# Patient Record
Sex: FEMALE | Race: WHITE | NOT HISPANIC OR LATINO | URBAN - METROPOLITAN AREA
[De-identification: names, ages, dates, MRNs, and addresses within clinical notes are randomized per-mention and may not be internally consistent; named-entity substitution may affect disease eponyms.]

---

## 2020-06-01 ENCOUNTER — OUTPATIENT (OUTPATIENT)
Dept: OUTPATIENT SERVICES | Facility: HOSPITAL | Age: 45
LOS: 1 days | End: 2020-06-01
Payer: MEDICAID

## 2020-06-09 ENCOUNTER — TRANSCRIPTION ENCOUNTER (OUTPATIENT)
Age: 45
End: 2020-06-09

## 2020-06-09 ENCOUNTER — INPATIENT (INPATIENT)
Facility: HOSPITAL | Age: 45
LOS: 2 days | Discharge: HOME | DRG: 872 | End: 2020-06-12
Attending: HOSPITALIST | Admitting: HOSPITALIST
Payer: MEDICAID

## 2020-06-09 VITALS
HEART RATE: 111 BPM | DIASTOLIC BLOOD PRESSURE: 68 MMHG | RESPIRATION RATE: 16 BRPM | SYSTOLIC BLOOD PRESSURE: 123 MMHG | OXYGEN SATURATION: 96 % | HEIGHT: 63 IN | TEMPERATURE: 100 F | WEIGHT: 115.08 LBS

## 2020-06-09 DIAGNOSIS — E28.319 ASYMPTOMATIC PREMATURE MENOPAUSE: ICD-10-CM

## 2020-06-09 DIAGNOSIS — F12.90 CANNABIS USE, UNSPECIFIED, UNCOMPLICATED: ICD-10-CM

## 2020-06-09 DIAGNOSIS — R63.8 OTHER SYMPTOMS AND SIGNS CONCERNING FOOD AND FLUID INTAKE: ICD-10-CM

## 2020-06-09 DIAGNOSIS — F32.9 MAJOR DEPRESSIVE DISORDER, SINGLE EPISODE, UNSPECIFIED: ICD-10-CM

## 2020-06-09 DIAGNOSIS — R80.9 PROTEINURIA, UNSPECIFIED: ICD-10-CM

## 2020-06-09 DIAGNOSIS — A41.9 SEPSIS, UNSPECIFIED ORGANISM: ICD-10-CM

## 2020-06-09 DIAGNOSIS — F41.9 ANXIETY DISORDER, UNSPECIFIED: ICD-10-CM

## 2020-06-09 DIAGNOSIS — E87.1 HYPO-OSMOLALITY AND HYPONATREMIA: ICD-10-CM

## 2020-06-09 LAB
ALBUMIN SERPL ELPH-MCNC: 3.5 G/DL — SIGNIFICANT CHANGE UP (ref 3.3–5)
ALP SERPL-CCNC: 71 U/L — SIGNIFICANT CHANGE UP (ref 40–120)
ALT FLD-CCNC: 18 U/L — SIGNIFICANT CHANGE UP (ref 10–45)
ANION GAP SERPL CALC-SCNC: 12 MMOL/L — SIGNIFICANT CHANGE UP (ref 5–17)
APPEARANCE UR: ABNORMAL
AST SERPL-CCNC: 17 U/L — SIGNIFICANT CHANGE UP (ref 10–40)
BASOPHILS # BLD AUTO: 0 K/UL — SIGNIFICANT CHANGE UP (ref 0–0.2)
BASOPHILS NFR BLD AUTO: 0 % — SIGNIFICANT CHANGE UP (ref 0–2)
BILIRUB SERPL-MCNC: 0.5 MG/DL — SIGNIFICANT CHANGE UP (ref 0.2–1.2)
BILIRUB UR-MCNC: NEGATIVE — SIGNIFICANT CHANGE UP
BUN SERPL-MCNC: 7 MG/DL — SIGNIFICANT CHANGE UP (ref 7–23)
CALCIUM SERPL-MCNC: 8.2 MG/DL — LOW (ref 8.4–10.5)
CHLORIDE SERPL-SCNC: 96 MMOL/L — SIGNIFICANT CHANGE UP (ref 96–108)
CO2 SERPL-SCNC: 25 MMOL/L — SIGNIFICANT CHANGE UP (ref 22–31)
COLOR SPEC: YELLOW — SIGNIFICANT CHANGE UP
CREAT SERPL-MCNC: 0.84 MG/DL — SIGNIFICANT CHANGE UP (ref 0.5–1.3)
DIFF PNL FLD: ABNORMAL
EOSINOPHIL # BLD AUTO: 0 K/UL — SIGNIFICANT CHANGE UP (ref 0–0.5)
EOSINOPHIL NFR BLD AUTO: 0 % — SIGNIFICANT CHANGE UP (ref 0–6)
GLUCOSE SERPL-MCNC: 132 MG/DL — HIGH (ref 70–99)
GLUCOSE UR QL: NEGATIVE — SIGNIFICANT CHANGE UP
HCT VFR BLD CALC: 34.4 % — LOW (ref 34.5–45)
HGB BLD-MCNC: 11.5 G/DL — SIGNIFICANT CHANGE UP (ref 11.5–15.5)
KETONES UR-MCNC: NEGATIVE — SIGNIFICANT CHANGE UP
LACTATE SERPL-SCNC: 0.9 MMOL/L — SIGNIFICANT CHANGE UP (ref 0.5–2)
LEUKOCYTE ESTERASE UR-ACNC: ABNORMAL
LYMPHOCYTES # BLD AUTO: 0.65 K/UL — LOW (ref 1–3.3)
LYMPHOCYTES # BLD AUTO: 4.4 % — LOW (ref 13–44)
MCHC RBC-ENTMCNC: 29.3 PG — SIGNIFICANT CHANGE UP (ref 27–34)
MCHC RBC-ENTMCNC: 33.4 GM/DL — SIGNIFICANT CHANGE UP (ref 32–36)
MCV RBC AUTO: 87.5 FL — SIGNIFICANT CHANGE UP (ref 80–100)
MONOCYTES # BLD AUTO: 1.16 K/UL — HIGH (ref 0–0.9)
MONOCYTES NFR BLD AUTO: 7.9 % — SIGNIFICANT CHANGE UP (ref 2–14)
NEUTROPHILS # BLD AUTO: 12.87 K/UL — HIGH (ref 1.8–7.4)
NEUTROPHILS NFR BLD AUTO: 69.3 % — SIGNIFICANT CHANGE UP (ref 43–77)
NITRITE UR-MCNC: NEGATIVE — SIGNIFICANT CHANGE UP
PH UR: 6.5 — SIGNIFICANT CHANGE UP (ref 5–8)
PLATELET # BLD AUTO: 163 K/UL — SIGNIFICANT CHANGE UP (ref 150–400)
POTASSIUM SERPL-MCNC: 3.6 MMOL/L — SIGNIFICANT CHANGE UP (ref 3.5–5.3)
POTASSIUM SERPL-SCNC: 3.6 MMOL/L — SIGNIFICANT CHANGE UP (ref 3.5–5.3)
PROT SERPL-MCNC: 6.8 G/DL — SIGNIFICANT CHANGE UP (ref 6–8.3)
PROT UR-MCNC: 100 MG/DL
RAPID RVP RESULT: SIGNIFICANT CHANGE UP
RBC # BLD: 3.93 M/UL — SIGNIFICANT CHANGE UP (ref 3.8–5.2)
RBC # FLD: 12.6 % — SIGNIFICANT CHANGE UP (ref 10.3–14.5)
SARS-COV-2 RNA SPEC QL NAA+PROBE: SIGNIFICANT CHANGE UP
SODIUM SERPL-SCNC: 133 MMOL/L — LOW (ref 135–145)
SP GR SPEC: 1.02 — SIGNIFICANT CHANGE UP (ref 1–1.03)
UROBILINOGEN FLD QL: 1 E.U./DL — SIGNIFICANT CHANGE UP
WBC # BLD: 14.67 K/UL — HIGH (ref 3.8–10.5)
WBC # FLD AUTO: 14.67 K/UL — HIGH (ref 3.8–10.5)

## 2020-06-09 PROCEDURE — 99291 CRITICAL CARE FIRST HOUR: CPT

## 2020-06-09 PROCEDURE — 99223 1ST HOSP IP/OBS HIGH 75: CPT | Mod: GC

## 2020-06-09 PROCEDURE — 93010 ELECTROCARDIOGRAM REPORT: CPT

## 2020-06-09 PROCEDURE — 71045 X-RAY EXAM CHEST 1 VIEW: CPT | Mod: 26

## 2020-06-09 RX ORDER — SERTRALINE 25 MG/1
0 TABLET, FILM COATED ORAL
Qty: 0 | Refills: 0 | DISCHARGE

## 2020-06-09 RX ORDER — SERTRALINE 25 MG/1
100 TABLET, FILM COATED ORAL DAILY
Refills: 0 | Status: DISCONTINUED | OUTPATIENT
Start: 2020-06-09 | End: 2020-06-12

## 2020-06-09 RX ORDER — ACETAMINOPHEN 500 MG
650 TABLET ORAL EVERY 6 HOURS
Refills: 0 | Status: DISCONTINUED | OUTPATIENT
Start: 2020-06-09 | End: 2020-06-10

## 2020-06-09 RX ORDER — IBUPROFEN 200 MG
800 TABLET ORAL ONCE
Refills: 0 | Status: COMPLETED | OUTPATIENT
Start: 2020-06-09 | End: 2020-06-09

## 2020-06-09 RX ORDER — CEFTRIAXONE 500 MG/1
1000 INJECTION, POWDER, FOR SOLUTION INTRAMUSCULAR; INTRAVENOUS ONCE
Refills: 0 | Status: COMPLETED | OUTPATIENT
Start: 2020-06-09 | End: 2020-06-09

## 2020-06-09 RX ORDER — ENOXAPARIN SODIUM 100 MG/ML
40 INJECTION SUBCUTANEOUS EVERY 24 HOURS
Refills: 0 | Status: DISCONTINUED | OUTPATIENT
Start: 2020-06-09 | End: 2020-06-12

## 2020-06-09 RX ORDER — CEFTRIAXONE 500 MG/1
2000 INJECTION, POWDER, FOR SOLUTION INTRAMUSCULAR; INTRAVENOUS EVERY 24 HOURS
Refills: 0 | Status: DISCONTINUED | OUTPATIENT
Start: 2020-06-10 | End: 2020-06-10

## 2020-06-09 RX ORDER — CEFTRIAXONE 500 MG/1
1000 INJECTION, POWDER, FOR SOLUTION INTRAMUSCULAR; INTRAVENOUS ONCE
Refills: 0 | Status: DISCONTINUED | OUTPATIENT
Start: 2020-06-09 | End: 2020-06-09

## 2020-06-09 RX ORDER — SODIUM CHLORIDE 9 MG/ML
1000 INJECTION INTRAMUSCULAR; INTRAVENOUS; SUBCUTANEOUS ONCE
Refills: 0 | Status: COMPLETED | OUTPATIENT
Start: 2020-06-09 | End: 2020-06-09

## 2020-06-09 RX ORDER — SODIUM CHLORIDE 9 MG/ML
1000 INJECTION INTRAMUSCULAR; INTRAVENOUS; SUBCUTANEOUS
Refills: 0 | Status: DISCONTINUED | OUTPATIENT
Start: 2020-06-09 | End: 2020-06-10

## 2020-06-09 RX ORDER — SERTRALINE 25 MG/1
1 TABLET, FILM COATED ORAL
Qty: 0 | Refills: 0 | DISCHARGE

## 2020-06-09 RX ORDER — CLONAZEPAM 1 MG
1 TABLET ORAL
Qty: 0 | Refills: 0 | DISCHARGE

## 2020-06-09 RX ORDER — CLONAZEPAM 1 MG
0 TABLET ORAL
Qty: 0 | Refills: 0 | DISCHARGE

## 2020-06-09 RX ORDER — CLONAZEPAM 1 MG
1 TABLET ORAL EVERY 8 HOURS
Refills: 0 | Status: DISCONTINUED | OUTPATIENT
Start: 2020-06-09 | End: 2020-06-12

## 2020-06-09 RX ORDER — LANOLIN ALCOHOL/MO/W.PET/CERES
5 CREAM (GRAM) TOPICAL ONCE
Refills: 0 | Status: COMPLETED | OUTPATIENT
Start: 2020-06-09 | End: 2020-06-09

## 2020-06-09 RX ORDER — POTASSIUM CHLORIDE 20 MEQ
40 PACKET (EA) ORAL ONCE
Refills: 0 | Status: COMPLETED | OUTPATIENT
Start: 2020-06-09 | End: 2020-06-09

## 2020-06-09 RX ADMIN — SODIUM CHLORIDE 1000 MILLILITER(S): 9 INJECTION INTRAMUSCULAR; INTRAVENOUS; SUBCUTANEOUS at 15:10

## 2020-06-09 RX ADMIN — SODIUM CHLORIDE 1000 MILLILITER(S): 9 INJECTION INTRAMUSCULAR; INTRAVENOUS; SUBCUTANEOUS at 15:32

## 2020-06-09 RX ADMIN — Medication 1 MILLIGRAM(S): at 23:04

## 2020-06-09 RX ADMIN — Medication 800 MILLIGRAM(S): at 15:32

## 2020-06-09 RX ADMIN — Medication 40 MILLIEQUIVALENT(S): at 19:07

## 2020-06-09 RX ADMIN — ENOXAPARIN SODIUM 40 MILLIGRAM(S): 100 INJECTION SUBCUTANEOUS at 20:04

## 2020-06-09 RX ADMIN — SODIUM CHLORIDE 75 MILLILITER(S): 9 INJECTION INTRAMUSCULAR; INTRAVENOUS; SUBCUTANEOUS at 23:04

## 2020-06-09 RX ADMIN — CEFTRIAXONE 100 MILLIGRAM(S): 500 INJECTION, POWDER, FOR SOLUTION INTRAMUSCULAR; INTRAVENOUS at 15:52

## 2020-06-09 RX ADMIN — SERTRALINE 100 MILLIGRAM(S): 25 TABLET, FILM COATED ORAL at 21:40

## 2020-06-09 RX ADMIN — Medication 5 MILLIGRAM(S): at 21:40

## 2020-06-09 RX ADMIN — CEFTRIAXONE 100 MILLIGRAM(S): 500 INJECTION, POWDER, FOR SOLUTION INTRAMUSCULAR; INTRAVENOUS at 20:04

## 2020-06-09 NOTE — H&P ADULT - ASSESSMENT
Ms. Hirsch is 45yo F w/ no medical conditions who presents with 2 days persistent fever found to have R. CVA tenderness and positive UA concerning for sepsis 2/2 pyelonephritis. Ms. Hirsch is 45yo post menopausal woman w/ depression and HPV who presents with intermittent vaginal pain and 4 days persistent fever found to have R. CVA tenderness and positive UA concerning for sepsis 2/2 pyelonephritis.

## 2020-06-09 NOTE — DISCHARGE NOTE PROVIDER - HOSPITAL COURSE
REEMA GARDNER is 44y woman w/ no medical conditions who presents with 2 days persistent fever found to have R. CVA tenderness and positive UA concerning for sepsis 2/2 pyelonephritis.        Problem List/Main Diagnoses (system-based):         PSYCH:        RENAL:    #Proteinuria: UA with protein, likely 2/2 to infection. No Hx of DM (no glucose in urine either). Recommend outpatient eval.         ID:    Sepsis 2/2 Pylonephritis: On admission meeting 3/4 SIRS (HR>90, WBC>12, Temp >100.4) with Bands 18.4, Lactate 0.9. Soft pressures but oriented and w/o evidence of end organ damage. COVID neg. CXR clear, with no open wounds. UA+ with CVA tenderness concerning for pyelonephritis. Continue with Cefpodoxime 200mg BID for 10 day Course (End June 18).        New medications: Cefpodoxime 200mg    Labs to be followed outpatient: UA    Exam to be followed outpatient: CVA tenderness 44F with PMH of depression, HPV, and early menopause who presented with intermittent vaginal pain and 4 days persistent fever, met sepsis criteria 2/2 to right pyelonephritis seen on imaging, now on antibiotics and admitted to the [facility] for further management.        Problem List/Main Diagnoses:    #Sepsis 2/2 Pyelonephritis    #E. coli UTI    #GNR Bacteremia    #Proteinuria    #Anxiety/Depression    #HPV        Hospital Course:    44F with PMH of depression, HPV, and early menopause who presented with intermittent vaginal pain and 4 days persistent fever, met sepsis criteria 2/2 to right pyelonephritis seen on imaging and admitted to the regional medical floor for further work up. Patient originally underwent CT abdomen/pelvis without contrast which was followed up by retroperitoneal ultrasound to evaluate for abscess. Ultrasound demonstrated possible abscess in right kidney for which patient was re-imaged with CT abdomen/pelvis with oral/IV contrast and NM renal scan to evaluate kidney function. These studies showed***. Early in admission the patient had intermittent high temps (Tmax 104 F) controlled with Tylenol. She was started on Ceftriaxone. The patient's course was complicated by E. coli UTI and bacteremia***.         New medications:     Labs to be followed outpatient: UA    Exam to be followed outpatient: CVA tenderness 44F with PMH of depression, HPV, and early menopause who presented with intermittent vaginal pain and 4 days persistent fever, met sepsis criteria 2/2 to right pyelonephritis seen on imaging, now on antibiotics and admitted to the [facility] for further management.        Problem List/Main Diagnoses:    #Sepsis 2/2 Pyelonephritis    #E. coli UTI    #GNR Bacteremia    #Proteinuria    #Anxiety/Depression    #HPV        Hospital Course:    44F with PMH of depression, HPV, and early menopause who presented with intermittent vaginal pain and 4 days persistent fever, met sepsis criteria 2/2 to right pyelonephritis seen on imaging and admitted to the regional medical floor for further work up. Patient originally underwent CT abdomen/pelvis without contrast which was followed up by retroperitoneal ultrasound to evaluate for abscess. Ultrasound demonstrated possible abscess in right kidney for which patient was re-imaged with CT abdomen/pelvis with oral/IV contrast and NM renal scan to evaluate kidney function. These studies were negative for abscess and showed*** function of the kidneys. During admission the patient had intermittent high temps (Tmax 104 F) controlled with Tylenol. She was started on Ceftriaxone and broadened to Zosyn after one day given persistently high temperatures. The patient's course was complicated by E. coli UTI and bacteremia. Surveillance blood cultures were negative and the patient appeared to improve clinically after initiation of antibiotics.         New medications: Zosyn    Labs to be followed outpatient: UA    Exam to be followed outpatient: CVA tenderness 44F with PMH of depression, HPV, and early menopause who presented with intermittent vaginal pain and 4 days persistent fever, met sepsis criteria 2/2 to right pyelonephritis seen on imaging, now on antibiotics and admitted to the [facility] for further management.        Problem List/Main Diagnoses:    #Sepsis 2/2 Pyelonephritis    #E. coli UTI    #E. coli Bacteremia    #Proteinuria    #Anxiety/Depression    #HPV        Hospital Course:    44F with PMH of depression, HPV, and early menopause who presented with intermittent vaginal pain and 4 days persistent fever, met sepsis criteria 2/2 to right pyelonephritis seen on imaging and admitted to the regional medical floor for further work up. Patient originally underwent CT abdomen/pelvis without contrast which was followed up by retroperitoneal ultrasound to evaluate for abscess. Ultrasound demonstrated possible abscess in right kidney for which patient was re-imaged with CT abdomen/pelvis with oral/IV contrast and NM renal scan to evaluate kidney function. These studies were negative for abscess and showed preserved renal function with no obstruction. During admission the patient had intermittent high temps (Tmax 104 F) controlled with Tylenol. She was started on Ceftriaxone and broadened to Zosyn after one day given persistently high temperatures. The patient's course was complicated by E. coli UTI and bacteremia. Surveillance blood cultures were negative and the patient appeared to improve clinically after initiation of antibiotics. She was deemed stable for discharge on 6/12 with Urology follow up.        New medications: Ciprofloxacin    Labs to be followed outpatient: UA    Exam to be followed outpatient: CVA tenderness 44F with PMH of depression, HPV, and early menopause who presented with intermittent vaginal pain and 4 days persistent fever, met sepsis criteria 2/2 to right pyelonephritis seen on imaging, now on antibiotics and admitted to the [facility] for further management.        Problem List/Main Diagnoses:    #Sepsis 2/2 Pyelonephritis    #E. coli UTI    #E. coli Bacteremia    #Proteinuria    #Anxiety/Depression    #HPV        Hospital Course:    44F with PMH of depression, HPV, and early menopause who presented with intermittent vaginal pain and 4 days persistent fever, met sepsis criteria 2/2 to right pyelonephritis seen on imaging and admitted to the regional medical floor for further work up. Patient originally underwent CT abdomen/pelvis without contrast which was followed up by retroperitoneal ultrasound to evaluate for abscess. Ultrasound demonstrated possible abscess in right kidney for which patient was re-imaged with CT abdomen/pelvis with oral/IV contrast and NM renal scan to evaluate kidney function. These studies were negative for abscess and showed preserved renal function with no obstruction. During admission the patient had intermittent high temps (Tmax 104 F) controlled with Tylenol. She was started on Ceftriaxone and broadened to Zosyn after one day given persistently high temperatures. The patient's course was complicated by E. coli UTI and bacteremia. Surveillance blood cultures were negative and the patient appeared to improve clinically after initiation of antibiotics. She was deemed stable for discharge on 6/12 with Urology follow up.        New medications: Bactrim    Labs to be followed outpatient: UA    Exam to be followed outpatient: CVA tenderness

## 2020-06-09 NOTE — DISCHARGE NOTE PROVIDER - PROVIDER TOKENS
FREE:[LAST:[[facility]],PHONE:[phone]],FAX:[(   )    -],ADDRESS:[[address] [place] [address]]] FREE:[LAST:[[facility]],PHONE:[phone]],FAX:[(   )    -],ADDRESS:[[address] [place] [address]]],PROVIDER:[TOKEN:[[id number]]] PROVIDER:[TOKEN:[[id number]]],FREE:[LAST:[[facility]],PHONE:[phone]],FAX:[(   )    -],ADDRESS:[[facility] [address] [place] [address]]],PROVIDER:[TOKEN:[[id number]]]

## 2020-06-09 NOTE — H&P ADULT - PROBLEM SELECTOR PLAN 2
UA with protein, likely 2/2 to infection. No Hx of DM (no glucose in urine either).   -recommend outpatient eval UA with protein, likely 2/2 to infection. No Hx of DM (no glucose in urine either).   -recommend outpatient eval    #Hematuria  Likely 2/2 infection. Hb stable  -continue to monitor ADDENDUM: see above, on ceftriaxone, IVFs o/n in setting of low bp; followup ctxs ADDENDUM: see above, on ceftriaxone, IVFs o/n in setting of low bp; followup ctxs    #horseshoe kidney seen on prelim CT a/p, followup official report

## 2020-06-09 NOTE — ED PROVIDER NOTE - ATTENDING CONTRIBUTION TO CARE
44F pmh   p/w fever since friday, 102-104F taking tylenol, fever, chills, diffuse body aches. UA at  pos yesterday, unable to pickup abx thus far. Denies HA/n/v, coughing, diarrhea. Denies any urinary sx, freq/urg, no sick contacts. Exam tachycardic, febrile, +CVA, nontoxic. 44F no sig pmh, no prior surgeries, p/w fever since friday, 102-104F taking tylenol, fever, chills, diffuse body aches. UA at  pos yesterday, unable to pickup abx thus far. Denies HA/n/v, coughing, diarrhea. Denies any urinary sx, freq/urg, no sick contacts. Exam tachycardic, febrile, +CVA, nontoxic. Concern pyelo, uti, sepsis, less likely URI, consider viral process.

## 2020-06-09 NOTE — ED PROVIDER NOTE - DIAGNOSTIC INTERPRETATION
Chest x-ray interpreted by ER Physician Dr. Lam  Findings: heart size normal, no infiltrates, no pleural effusion, no PTX, no appreciated fracture.

## 2020-06-09 NOTE — DISCHARGE NOTE PROVIDER - NSDCMRMEDTOKEN_GEN_ALL_CORE_FT
KlonoPIN:   Zoloft: clonazePAM 1 mg oral tablet: 1 tab(s) orally 3 times a day, As Needed  sertraline 100 mg oral tablet: 1 tab(s) orally once a day Bactrim  mg-160 mg oral tablet: 1 tab(s) orally every 12 hours   clonazePAM 1 mg oral tablet: 1 tab(s) orally 3 times a day, As Needed  sertraline 100 mg oral tablet: 1 tab(s) orally once a day

## 2020-06-09 NOTE — H&P ADULT - PROBLEM SELECTOR PLAN 4
F: none  E: replete K <4, Mg <2  N: Regular  VTE: Padua <4 SCDs  GI PPx: Not indicated  Sleep Aid: None  Dispo: RMF  Code: Full    1) PCP   2) Date of Contact with PCP: o/n admission  3) PCP Contacted at Discharge: TBD  4) Summary of Handoff Given to PCP: TBD  5) Post-Discharge Appointment Date and Location: TBD Follows w/ jeromy johnson- [phone]  -continue home sertraline 100mg  -Clonazepam 1mg PRN TID reports early menopause. did not note when her LMP was. Unclear of family Hx. Also w/ dyspareunia and vaginal dryness  -f/u collateral from OB vs consult and set up outpatient reports early menopause. reports stopped having periods around 39 years old. with fhx also with early menopause. Also w/ dyspareunia and vaginal dryness.  -f/u collateral from OB vs consult and set up outpatient  -Requesting outpatient referral for gyn for vaginal dryness UA with protein, likely 2/2 to infection. No Hx of DM (no glucose in urine either).   -recommend outpatient eval    #Hematuria  Likely 2/2 infection. Hb stable  -continue to monitor

## 2020-06-09 NOTE — DISCHARGE NOTE PROVIDER - NSDCFUADDAPPT_GEN_ALL_CORE_FT
Please follow up with your new Primary Care Provider at [facility] on ____ @ ____. Visit the [address] office. If you need to make any changes to your appointment please call. Please bring a copy of this paper work with you. Please follow up with your Primary Care Provider, Dr. Ojeda, within 1-2 weeks after discharge.    Please follow up with Dr. Kirk on Monday, June 22nd at 1:00 PM. Please follow up with your Primary Care Provider, Dr. Ojeda, within 1-2 weeks after discharge.    Please follow up with Dr. Kirk (Urology) on Monday, June 22nd at 1:00 PM.

## 2020-06-09 NOTE — H&P ADULT - PROBLEM SELECTOR PLAN 7
F: none  E: replete K <4, Mg <2  N: Regular  VTE: Padua <4 SCDs  GI PPx: Not indicated  Sleep Aid: None  Dispo: RMF  Code: Full    1) PCP   2) Date of Contact with PCP: o/n admission  3) PCP Contacted at Discharge: TBD  4) Summary of Handoff Given to PCP: TBD  5) Post-Discharge Appointment Date and Location: TBD Daily user  -Discuss patient openness to quitting or cutting back Follows w/ jeromy johnson- [phone]  -continue home sertraline 100mg  -Clonazepam 1mg PRN TID

## 2020-06-09 NOTE — H&P ADULT - PROBLEM SELECTOR PLAN 5
reports early menopause. did not note when her LMP was. Unclear of family Hx  -f/u collateral from OB Follows w/ jeromy johnson- [phone]  -continue home sertraline 100mg  -Clonazepam 1mg PRN TID Glucose >130 on admission  -f/u A1C    ADDENDUM: likely stress related hyperglycemia in setting of infection, monitor glucose, check a1c

## 2020-06-09 NOTE — ED PROVIDER NOTE - OBJECTIVE STATEMENT
45 y/o F with no PMHx presents to the ED with fever, body aches, chills, going on since friday. Noted fever of 102, which improved, went to urgent care yesteday and noted UTI and given Abx. Never picked up the abx but today noted a fever of 104 alongside aches and chills Denies any headache, dizziness, nausea, vomiting, chest pain, SOB, abdominal pain, back pain, cough, urinary symptoms, no sick contacts, no travel.

## 2020-06-09 NOTE — H&P ADULT - PROBLEM SELECTOR PLAN 8
F: none  E: replete K <4, Mg <2  N: Regular  VTE: Padua <4 SCDs  GI PPx: Not indicated  Sleep Aid: None  Dispo: RMF  Code: Full    1) PCP   2) Date of Contact with PCP: o/n admission  3) PCP Contacted at Discharge: TBD  4) Summary of Handoff Given to PCP: TBD  5) Post-Discharge Appointment Date and Location: TBD Daily user  -Discuss patient openness to quitting or cutting back

## 2020-06-09 NOTE — H&P ADULT - NSHPSOCIALHISTORY_GEN_ALL_CORE
.  Tobacco use:   EtOH use:  Illicit drug use:    Living situation:  Occupation: .  Tobacco use: none  EtOH use: 1 glass wine weekly  Illicit drug use: Daily marijuana    Living situation: [address] with BF (edy) monogamous  Occupation: Recent  (left 11/2019)

## 2020-06-09 NOTE — ED ADULT NURSE NOTE - OBJECTIVE STATEMENT
43 y/o female presents to ED with c/o fevers/chills/body aches x3 days. Denies cp/sob/cough, no loss of taste/smell, no n/v, endorses some diarrhea. States she has been drinking fluids/gatorade and taking Tylenol for fever/body aches which provides some relief. Speaking in full sentences. Pt with hx anxiety, appears anxious and tearful at this time.

## 2020-06-09 NOTE — H&P ADULT - ATTENDING COMMENTS
patient seen and examined overnight a/f sepsis 2/2 right sided pyelonephritis   reviewed pertinent data, h&p, PE as above except pt  is tired appearing, slightly dry to euvolemic on exam, w/ R CVA tenderness.     1. sepsis / R pyelonephritis: on ceftriaxone 2g, followup ctxs, followup renal sonogram; started on IVFs o/n in setting of borderline low BP (pt denies hx of low bp but is 60 kg; denies dizziness, feeling faint)

## 2020-06-09 NOTE — H&P ADULT - HISTORY OF PRESENT ILLNESS
Ms. Hirsch is 45yo F w/ no medical conditions who presents with 2 days persistent fever. Patient presented to an urgent care yesterday with complaint of fever. She had a positive UA and was written for Abx but she did not pick it up because her pharmacy didn't have it. The fever has been associated with body aches. She was initially treating her symptoms with Tylenol which was controlling it, but it persisted. Today, since she did not  her medications she noted a temp of 104. She denies dysuria, but endorses urinary frequency.     Patient denies Chills, headache, CP, SOB, Abdominal pain, N/V/D, numbness or chills.    In the ED:  Temp 101.9 | HR 91-99 | BP 89-97/52-66 | RR 16-18 | 99% RA |  Notable Labs: WBC 14.67, Bands 18.4, Lactate 0.9  Imaging: CXR clear w/o infiltrate. No pulmonary edema.  EKG: Pending  Treatment Received: 1g CTX, 800mg Ibuprofen, 1L NS x2 Ms. Hirsch is 43yo F w/ no medical conditions who presents with 2 days persistent fever.  The fever has been associated with body aches. She was initially treating her symptoms with Tylenol which was controlling it, but it persisted. Patient presented to a [facility] urgent care 6/8 with complaint of fever. She had a positive UA and was written for Macrobid 100mg BID for 7 days but she did not pick it up because her pharmacy didn't have it. Today, she noted a temp of 104 that was not improved by tylenol so she came to the ED. She denies dysuria, but endorses urinary frequency.     Patient denies Chills, headache, CP, SOB, Abdominal pain, N/V/D, numbness or chills.    In the ED:  Temp 101.9 | HR 91-99 | BP 89-97/52-66 | RR 16-18 | 99% RA |  Notable Labs: WBC 14.67, Bands 18.4, Lactate 0.9  Imaging: CXR clear w/o infiltrate. No pulmonary edema.  EKG: Pending  Treatment Received: 1g CTX, 800mg Ibuprofen, 1L NS x2 Ms. Hirsch is 43yo post menopausal woman w/ depression and HPV who presents with intermittent vaginal pain and 4 days persistent fever. On 6/3 patient, with one episode of vaginal pain after intercourse with boyfriend. It resolved the next day. Then on 6/6 she noted fevers. The fever has been associated with Chills and body aches. She was initially tried treating her symptoms with Tylenol which did not help. Patient presented to a [facility] urgent care 6/8 with complaint of fever. She had a positive UA and was written for Macrobid 100mg BID for 7 days but she did not pick it up because of an issue with the prescription. Today, she noted a temp of 104, called that was not improved by tylenol so she came to the ED. Notes intermittent dyspareunia and vaginal dryness.     Recent COVID Contacts: Last Wednesday Cousin- had COVID 2 months ago    Patient denies: Headache, CP, Cough, SOB, Abdominal pain, N/V/D, dysuria, urinary frequency, vaginal discharge, numbness or chills.    In the ED:  Temp 101.9 | HR 91-99 | BP 89-97/52-66 | RR 16-18 | 99% RA |  Notable Labs: WBC 14.67, Bands 18.4, Lactate 0.9  Imaging: CXR clear w/o infiltrate. No pulmonary edema.  EKG: Pending  Treatment Received: 1g CTX, 800mg Ibuprofen, 1L NS x2 Ms. Hirsch is 45yo post menopausal woman w/ depression and HPV who presents with intermittent vaginal pain and 4 days persistent fever. On 6/3 patient, with one episode of vaginal pain after intercourse with boyfriend. It resolved the next day. Then on 6/6 she noted fevers. The fever has been associated with Chills and body aches. She was initially tried treating her symptoms with Tylenol which did not help. Patient presented to a [facility] urgent care 6/8 with complaint of fever. She had a positive UA and was written for Macrobid 100mg BID for 7 days but she did not pick it up because of an issue with the prescription. Today, she noted a temp of 104, called that was not improved by tylenol so she came to the ED. Notes intermittent dyspareunia and vaginal dryness.     Recent COVID Contacts: Last Wednesday Brother In Law- had COVID 2 months ago- remained outside the whole time, no direct contact    Patient denies: Headache, CP, Cough, SOB, Abdominal pain, N/V/D, dysuria, urinary frequency, vaginal discharge, numbness or chills.    In the ED:  Temp 101.9 | HR 91-99 | BP 89-97/52-66 | RR 16-18 | 99% RA |  Notable Labs: WBC 14.67, Bands 18.4, Lactate 0.9  Imaging: CXR clear w/o infiltrate. No pulmonary edema.  EKG: Pending  Treatment Received: 1g CTX, 800mg Ibuprofen, 1L NS x2

## 2020-06-09 NOTE — H&P ADULT - PROBLEM SELECTOR PLAN 6
Glucose >130 on admission  -f/u A1C reports early menopause. reports stopped having periods around 39 years old. with fhx also with early menopause. Also w/ dyspareunia and vaginal dryness.  -f/u collateral from OB vs consult and set up outpatient  -Requesting outpatient referral for gyn for vaginal dryness

## 2020-06-09 NOTE — H&P ADULT - NSHPLABSRESULTS_GEN_ALL_CORE
.  LABS:                         11.5   14.67 )-----------( 163      ( 2020 15:00 )             34.4         133<L>  |  96  |  7   ----------------------------<  132<H>  3.6   |  25  |  0.84    Ca    8.2<L>      2020 15:00    TPro  6.8  /  Alb  3.5  /  TBili  0.5  /  DBili  x   /  AST  17  /  ALT  18  /  AlkPhos  71        Urinalysis Basic - ( 2020 15:16 )    Color: Yellow / Appearance: Turbid / S.020 / pH: x  Gluc: x / Ketone: NEGATIVE  / Bili: Negative / Urobili: 1.0 E.U./dL   Blood: x / Protein: 100 mg/dL / Nitrite: NEGATIVE   Leuk Esterase: Large / RBC: 5-10 /HPF / WBC Many /HPF   Sq Epi: x / Non Sq Epi: 0-5 /HPF / Bacteria: Present /HPF            Lactate, Blood: 0.9 mmol/L ( @ 15:37)      RADIOLOGY, EKG & ADDITIONAL TESTS: Reviewed. .  LABS:                         11.5   14.67 )-----------( 163      ( 2020 15:00 )             34.4         133<L>  |  96  |  7   ----------------------------<  132<H>  3.6   |  25  |  0.84    Ca    8.2<L>      2020 15:00    TPro  6.8  /  Alb  3.5  /  TBili  0.5  /  DBili  x   /  AST  17  /  ALT  18  /  AlkPhos  71        Urinalysis Basic - ( 2020 15:16 )    Color: Yellow / Appearance: Turbid / S.020 / pH: x  Gluc: x / Ketone: NEGATIVE  / Bili: Negative / Urobili: 1.0 E.U./dL   Blood: x / Protein: 100 mg/dL / Nitrite: NEGATIVE   Leuk Esterase: Large / RBC: 5-10 /HPF / WBC Many /HPF   Sq Epi: x / Non Sq Epi: 0-5 /HPF / Bacteria: Present /HPF      Lactate, Blood: 0.9 mmol/L ( @ 15:37)      RADIOLOGY, EKG & ADDITIONAL TESTS: Reviewed.

## 2020-06-09 NOTE — H&P ADULT - NSHPPHYSICALEXAM_GEN_ALL_CORE
.  VITAL SIGNS:  T(C): 37.5 (06-09-20 @ 17:00), Max: 38.8 (06-09-20 @ 15:21)  T(F): 99.5 (06-09-20 @ 17:00), Max: 101.9 (06-09-20 @ 15:21)  HR: 96 (06-09-20 @ 17:00) (91 - 111)  BP: 96/63 (06-09-20 @ 17:00) (89/52 - 123/68)  BP(mean): --  RR: 16 (06-09-20 @ 17:00) (16 - 18)  SpO2: 99% (06-09-20 @ 17:00) (96% - 99%)  Wt(kg): --    PHYSICAL EXAM:    Constitutional: WDWN resting comfortably in bed; Ill Appearing  Head: NC/AT  Eyes: EOMI, anicteric sclera  ENT: no nasal discharge  Neck: supple  Respiratory: CTA b/l, no increased work of breathing, RA  Cardiac: +S1/S2, tachycardic  Gastrointestinal: soft, NT/ND; no rebound or guarding; +BS  Back: R. CVA tenderness  Extremities: WWP, no peripheral edema, 2+ pulses Radial and DP  Musculoskeletal: NROM x4  Dermatologic: skin warm, dry  Neurologic: Alert and oriented, no focal deficits appreciated  Psychiatric: affect and characteristics of appearance, verbalizations, behaviors are appropriate .  VITAL SIGNS:  T(C): 37.5 (06-09-20 @ 17:00), Max: 38.8 (06-09-20 @ 15:21)  T(F): 99.5 (06-09-20 @ 17:00), Max: 101.9 (06-09-20 @ 15:21)  HR: 96 (06-09-20 @ 17:00) (91 - 111)  BP: 96/63 (06-09-20 @ 17:00) (89/52 - 123/68)  BP(mean): --  RR: 16 (06-09-20 @ 17:00) (16 - 18)  SpO2: 99% (06-09-20 @ 17:00) (96% - 99%)  Wt(kg): --    PHYSICAL EXAM:    Constitutional: WDWN resting comfortably in bed; Ill Appearing  Head: NC/AT  Eyes: EOMI, anicteric sclera  ENT: no nasal discharge  Neck: supple  Respiratory: CTA b/l, no increased work of breathing, RA  Cardiac: +S1/S2, tachycardic  Gastrointestinal: soft, NT/ND; no rebound or guarding; +BS  Back: No CVA tenderness  Extremities: WWP, no peripheral edema, 2+ pulses Radial and DP  Musculoskeletal: NROM x4  Dermatologic: skin warm, dry  Neurologic: Alert and oriented, no focal deficits appreciated  Psychiatric: Odd affect, hesitant when answering questions. Anxious.

## 2020-06-09 NOTE — H&P ADULT - PROBLEM SELECTOR PLAN 3
Na 133 on admission. Likely hypovolemic hyponatremia in setting of poor PO while sick  -trend BMP, if persists or worsens would recommend evaluation of urine lytes and osm

## 2020-06-09 NOTE — ED ADULT TRIAGE NOTE - CHIEF COMPLAINT QUOTE
Pt directed to ED from  [facility].  Pt teary eyed during triage and states "I just don't want COVID"  Pt denies N/V/D, SOB, CP, dizziness.  Masked

## 2020-06-09 NOTE — H&P ADULT - PROBLEM SELECTOR PLAN 9
F: none  E: replete K <4, Mg <2  N: Regular  VTE: Padua <4 SCDs  GI PPx: Not indicated  Sleep Aid: None  Dispo: RMF  Code: Full    1) PCP   2) Date of Contact with PCP: o/n admission  3) PCP Contacted at Discharge: TBD  4) Summary of Handoff Given to PCP: TBD  5) Post-Discharge Appointment Date and Location: TBD

## 2020-06-09 NOTE — ED PROVIDER NOTE - CLINICAL SUMMARY MEDICAL DECISION MAKING FREE TEXT BOX
45 y/o F presents with fever, chills and body aches for 4 days, tachycardic at 111 and developed fever of 101.9, leukocytosis of 16.4, bandemia of 18. Patient is otherwise well appearing, however sepsis protocol conducted, UA showing + UTI, concern for pyelonephritis given CVAT and fever. Patient without signs of acute delirium, COVID swab negative, no concern for URI. Patient to be admitted for IV abx.

## 2020-06-09 NOTE — DISCHARGE NOTE PROVIDER - NSDCCPCAREPLAN_GEN_ALL_CORE_FT
PRINCIPAL DISCHARGE DIAGNOSIS  Diagnosis: Pyelonephritis  Assessment and Plan of Treatment: Pyelonephritis is an advance UTI that also affects the kidneys. A urinary tract infection (UTI) is an infection in any part of your urinary system — your kidneys, ureters, bladder and urethra. Most infections involve the lower urinary tract — the bladder and the urethra. You were treated with cefpodoxime. You will take 1 pill twice a day until 6/18. If you experience burning with urination or you develop a fever, please seek medical attention. Otherwise, follow up with your primary care provider in 2 weeks. PRINCIPAL DISCHARGE DIAGNOSIS  Diagnosis: Pyelonephritis  Assessment and Plan of Treatment: Pyelonephritis is an advanced UTI that also affects the kidneys. A urinary tract infection (UTI) is an infection in any part of your urinary system — your kidneys, ureters, bladder and urethra. Most infections involve the lower urinary tract — the bladder and the urethra. If you experience burning with urination or you develop a fever, please seek medical attention. Otherwise, follow up with your primary care provider in 2 weeks.      SECONDARY DISCHARGE DIAGNOSES  Diagnosis: Bacteremia  Assessment and Plan of Treatment: During your admission you were found to have an infection of your bloodstream. We suspect this was caused by your urinary tract infection and infection of your urinary system. You were treated with IV antibiotics and your blood was monitored to make sure that it was cleared of infection. It is important that you take your antibiotics to completion. If you develop chills, fever, nausea/vomiting, general malaise or other symptoms that are concerning to you, please return to the ED for further evaluation.    Diagnosis: E-coli UTI  Assessment and Plan of Treatment: You were noted to have a urinary tract infection (UTI) during your admission to [facility]. This was found based on your symptom profile (urinary frequency and/or pain on urination) as well as your urine tested for any infectious organisms. You were received antibiotics throughout your hospital course. Please follow-up with your primary care physician. Should you notice any symptoms such as but not limited to: unrelenting/severe fever (temperatuer >103 F and/or lasting >3 days), worsening pain on urination, urinary discharge, increased urinary frequency, chills, severe flank/lower back pain, or bloody urine, please return to the emergency department for interval evaluation.

## 2020-06-09 NOTE — DISCHARGE NOTE PROVIDER - CARE PROVIDER_API CALL
[facility],   [address] [place] [address]  Phone: [phone]  Fax: (   )    -  Follow Up Time: M,   [address] [place] [address]  Phone: [phone]  Fax: (   )    -  Follow Up Time:     Ara Ojeda  Internal Medicine  [address] [place] [address]  Phone: [phone]  Fax: [phone]  Follow Up Time: Ara Ojeda  Internal Medicine  [address] [place] [address]  Phone: [phone]  Fax: [phone]  Follow Up Time:     [facility],   [address] [place] [address]  Phone: [phone]  Fax: (   )    -  Follow Up Time:     Fredi Kirk  UROLOGY  [address] [address]  Phone: [phone]  Fax: [phone]  Follow Up Time:

## 2020-06-09 NOTE — ED ADULT NURSE REASSESSMENT NOTE - NS ED NURSE REASSESS COMMENT FT1
Pt noted to be hypotensive and febrile. Mentating well, aox3. 2nd IV access obtained, blood cultures and lactate drawn, 2nd IVF bolus initiated. 800mg PO motrin administered per order. Will reassess vital signs and monitor closely.

## 2020-06-09 NOTE — H&P ADULT - PROBLEM SELECTOR PLAN 1
On admission meeting 3/4 SIRS (HR>90, WBC>12, Temp >100.4) with Bands 18.4, Lactate 0.9. Soft pressures but oriented and w/o evidence of end organ damage. COVID neg. CXR clear, with no open wounds. UA+ with CVA tenderness concerning for pyelonephritis  -s/p 2L NS & 1g CTX in ed  -c/w CTX 1g q24 for 10 days (End June 18)  -if persists would consider u/s to r/o abcess  -f/u BCx- patient may fever in first 48 hours after pyelo, does not need immediate reculture unless HD unstable  -f/u UCx  -tylenol 650mg q6 PRN for fever On admission meeting 3/4 SIRS (HR>90, WBC>12, Temp >100.4) with Bands 18.4, Lactate 0.9. Soft pressures but oriented and w/o evidence of end organ damage. COVID neg. CXR clear, with no open wounds. UA+ with high fever concerning for pyelonephritis. Patient with reported Hx of HPV and vaginal pain so PID is on differential  -s/p 2L NS & 1g CTX in ed  -c/w CTX 1g q24 for 10 days (End June 18)  -f/u RP u/s, consider transvaginal if pain persists or reoccurs  -f/u BCx- patient may fever in first 48 hours after pyelo, does not need immediate reculture unless HD unstable  -f/u UCx  -tylenol 650mg q6 PRN for fever On admission meeting 3/4 SIRS (HR>90, WBC>12, Temp >100.4) with Bands 18.4, Lactate 0.9. Soft pressures but oriented and w/o evidence of end organ damage. COVID neg. CXR clear, with no open wounds. UA+ with high fever concerning for pyelonephritis. Patient with reported Hx of HPV and vaginal pain so PID is on differential  -s/p 2L NS & 1g CTX in ed  -c/w CTX 2g q24 for 10 days (End June 18)  -f/u RP u/s, consider transvaginal if pain persists or reoccurs  -f/u BCx- patient may fever in first 48 hours after pyelo, does not need immediate reculture unless HD unstable  -f/u UCx  -tylenol 650mg q6 PRN for fever On admission meeting 3/4 SIRS (HR>90, WBC>12, Temp >100.4) with Bands 18.4, Lactate 0.9. Soft pressures but oriented and w/o evidence of end organ damage. Pregnancy test neg. COVID neg. CXR clear, with no open wounds. UA+ with high fever concerning for pyelonephritis. Patient with reported Hx of HPV and vaginal pain so PID is on differential  -s/p 2L NS & 1g CTX in ed  -c/w CTX 2g q24 for 10 days (End June 18)  -f/u RP u/s, consider transvaginal if pain persists or reoccurs  -f/u BCx- patient may fever in first 48 hours after pyelo, does not need immediate reculture unless HD unstable  -f/u UCx  -f/u HIV and other STI screening  -tylenol 650mg q6 PRN for fever

## 2020-06-10 DIAGNOSIS — N10 ACUTE PYELONEPHRITIS: ICD-10-CM

## 2020-06-10 DIAGNOSIS — R73.9 HYPERGLYCEMIA, UNSPECIFIED: ICD-10-CM

## 2020-06-10 LAB
A1C WITH ESTIMATED AVERAGE GLUCOSE RESULT: 5.1 % — SIGNIFICANT CHANGE UP (ref 4–5.6)
ANION GAP SERPL CALC-SCNC: 10 MMOL/L — SIGNIFICANT CHANGE UP (ref 5–17)
BASOPHILS # BLD AUTO: 0 K/UL — SIGNIFICANT CHANGE UP (ref 0–0.2)
BASOPHILS NFR BLD AUTO: 0 % — SIGNIFICANT CHANGE UP (ref 0–2)
BUN SERPL-MCNC: 3 MG/DL — LOW (ref 7–23)
CALCIUM SERPL-MCNC: 8.1 MG/DL — LOW (ref 8.4–10.5)
CHLORIDE SERPL-SCNC: 107 MMOL/L — SIGNIFICANT CHANGE UP (ref 96–108)
CO2 SERPL-SCNC: 23 MMOL/L — SIGNIFICANT CHANGE UP (ref 22–31)
CREAT SERPL-MCNC: 0.6 MG/DL — SIGNIFICANT CHANGE UP (ref 0.5–1.3)
E COLI DNA BLD POS QL NAA+NON-PROBE: SIGNIFICANT CHANGE UP
EOSINOPHIL # BLD AUTO: 0 K/UL — SIGNIFICANT CHANGE UP (ref 0–0.5)
EOSINOPHIL NFR BLD AUTO: 0 % — SIGNIFICANT CHANGE UP (ref 0–6)
ESTIMATED AVERAGE GLUCOSE: 100 MG/DL — SIGNIFICANT CHANGE UP (ref 68–114)
GLUCOSE SERPL-MCNC: 120 MG/DL — HIGH (ref 70–99)
GRAM STN FLD: SIGNIFICANT CHANGE UP
GRAM STN FLD: SIGNIFICANT CHANGE UP
HCT VFR BLD CALC: 31 % — LOW (ref 34.5–45)
HGB BLD-MCNC: 10.2 G/DL — LOW (ref 11.5–15.5)
HIV 1+2 AB+HIV1 P24 AG SERPL QL IA: SIGNIFICANT CHANGE UP
LYMPHOCYTES # BLD AUTO: 0.27 K/UL — LOW (ref 1–3.3)
LYMPHOCYTES # BLD AUTO: 2.6 % — LOW (ref 13–44)
MAGNESIUM SERPL-MCNC: 1.8 MG/DL — SIGNIFICANT CHANGE UP (ref 1.6–2.6)
MCHC RBC-ENTMCNC: 29.7 PG — SIGNIFICANT CHANGE UP (ref 27–34)
MCHC RBC-ENTMCNC: 32.9 GM/DL — SIGNIFICANT CHANGE UP (ref 32–36)
MCV RBC AUTO: 90.4 FL — SIGNIFICANT CHANGE UP (ref 80–100)
METHOD TYPE: SIGNIFICANT CHANGE UP
MONOCYTES # BLD AUTO: 0.36 K/UL — SIGNIFICANT CHANGE UP (ref 0–0.9)
MONOCYTES NFR BLD AUTO: 3.5 % — SIGNIFICANT CHANGE UP (ref 2–14)
NEUTROPHILS # BLD AUTO: 9.69 K/UL — HIGH (ref 1.8–7.4)
NEUTROPHILS NFR BLD AUTO: 91.3 % — HIGH (ref 43–77)
PHOSPHATE SERPL-MCNC: 1.9 MG/DL — LOW (ref 2.5–4.5)
PLATELET # BLD AUTO: 132 K/UL — LOW (ref 150–400)
POTASSIUM SERPL-MCNC: 3.7 MMOL/L — SIGNIFICANT CHANGE UP (ref 3.5–5.3)
POTASSIUM SERPL-SCNC: 3.7 MMOL/L — SIGNIFICANT CHANGE UP (ref 3.5–5.3)
RBC # BLD: 3.43 M/UL — LOW (ref 3.8–5.2)
RBC # FLD: 12.9 % — SIGNIFICANT CHANGE UP (ref 10.3–14.5)
SODIUM SERPL-SCNC: 140 MMOL/L — SIGNIFICANT CHANGE UP (ref 135–145)
T PALLIDUM AB TITR SER: NEGATIVE — SIGNIFICANT CHANGE UP
WBC # BLD: 10.32 K/UL — SIGNIFICANT CHANGE UP (ref 3.8–10.5)
WBC # FLD AUTO: 10.32 K/UL — SIGNIFICANT CHANGE UP (ref 3.8–10.5)

## 2020-06-10 PROCEDURE — 74176 CT ABD & PELVIS W/O CONTRAST: CPT | Mod: 26,59

## 2020-06-10 PROCEDURE — 74177 CT ABD & PELVIS W/CONTRAST: CPT | Mod: 26

## 2020-06-10 PROCEDURE — 76775 US EXAM ABDO BACK WALL LIM: CPT | Mod: 26

## 2020-06-10 PROCEDURE — 99233 SBSQ HOSP IP/OBS HIGH 50: CPT | Mod: GC

## 2020-06-10 RX ORDER — ACETAMINOPHEN 500 MG
325 TABLET ORAL ONCE
Refills: 0 | Status: COMPLETED | OUTPATIENT
Start: 2020-06-10 | End: 2020-06-10

## 2020-06-10 RX ORDER — ACETAMINOPHEN 500 MG
975 TABLET ORAL EVERY 6 HOURS
Refills: 0 | Status: DISCONTINUED | OUTPATIENT
Start: 2020-06-10 | End: 2020-06-10

## 2020-06-10 RX ORDER — IOHEXOL 300 MG/ML
30 INJECTION, SOLUTION INTRAVENOUS ONCE
Refills: 0 | Status: COMPLETED | OUTPATIENT
Start: 2020-06-10 | End: 2020-06-10

## 2020-06-10 RX ORDER — IOHEXOL 300 MG/ML
30 INJECTION, SOLUTION INTRAVENOUS ONCE
Refills: 0 | Status: DISCONTINUED | OUTPATIENT
Start: 2020-06-10 | End: 2020-06-10

## 2020-06-10 RX ORDER — ACETAMINOPHEN 500 MG
1000 TABLET ORAL ONCE
Refills: 0 | Status: COMPLETED | OUTPATIENT
Start: 2020-06-10 | End: 2020-06-10

## 2020-06-10 RX ORDER — MAGNESIUM SULFATE 500 MG/ML
1 VIAL (ML) INJECTION ONCE
Refills: 0 | Status: COMPLETED | OUTPATIENT
Start: 2020-06-10 | End: 2020-06-10

## 2020-06-10 RX ORDER — PIPERACILLIN AND TAZOBACTAM 4; .5 G/20ML; G/20ML
4.5 INJECTION, POWDER, LYOPHILIZED, FOR SOLUTION INTRAVENOUS EVERY 6 HOURS
Refills: 0 | Status: DISCONTINUED | OUTPATIENT
Start: 2020-06-10 | End: 2020-06-11

## 2020-06-10 RX ORDER — CLONAZEPAM 1 MG
1 TABLET ORAL ONCE
Refills: 0 | Status: DISCONTINUED | OUTPATIENT
Start: 2020-06-10 | End: 2020-06-10

## 2020-06-10 RX ORDER — ACETAMINOPHEN 500 MG
975 TABLET ORAL EVERY 6 HOURS
Refills: 0 | Status: DISCONTINUED | OUTPATIENT
Start: 2020-06-11 | End: 2020-06-12

## 2020-06-10 RX ORDER — SODIUM CHLORIDE 9 MG/ML
500 INJECTION INTRAMUSCULAR; INTRAVENOUS; SUBCUTANEOUS ONCE
Refills: 0 | Status: COMPLETED | OUTPATIENT
Start: 2020-06-10 | End: 2020-06-10

## 2020-06-10 RX ORDER — POTASSIUM CHLORIDE 20 MEQ
40 PACKET (EA) ORAL ONCE
Refills: 0 | Status: COMPLETED | OUTPATIENT
Start: 2020-06-10 | End: 2020-06-10

## 2020-06-10 RX ADMIN — SODIUM CHLORIDE 2000 MILLILITER(S): 9 INJECTION INTRAMUSCULAR; INTRAVENOUS; SUBCUTANEOUS at 01:41

## 2020-06-10 RX ADMIN — Medication 1 MILLIGRAM(S): at 09:19

## 2020-06-10 RX ADMIN — Medication 975 MILLIGRAM(S): at 18:26

## 2020-06-10 RX ADMIN — Medication 650 MILLIGRAM(S): at 00:25

## 2020-06-10 RX ADMIN — SERTRALINE 100 MILLIGRAM(S): 25 TABLET, FILM COATED ORAL at 14:56

## 2020-06-10 RX ADMIN — Medication 100 GRAM(S): at 09:20

## 2020-06-10 RX ADMIN — IOHEXOL 30 MILLILITER(S): 300 INJECTION, SOLUTION INTRAVENOUS at 16:33

## 2020-06-10 RX ADMIN — SODIUM CHLORIDE 75 MILLILITER(S): 9 INJECTION INTRAMUSCULAR; INTRAVENOUS; SUBCUTANEOUS at 05:57

## 2020-06-10 RX ADMIN — Medication 975 MILLIGRAM(S): at 09:20

## 2020-06-10 RX ADMIN — ENOXAPARIN SODIUM 40 MILLIGRAM(S): 100 INJECTION SUBCUTANEOUS at 20:27

## 2020-06-10 RX ADMIN — Medication 40 MILLIEQUIVALENT(S): at 09:20

## 2020-06-10 RX ADMIN — Medication 1 MILLIGRAM(S): at 17:00

## 2020-06-10 RX ADMIN — CEFTRIAXONE 100 MILLIGRAM(S): 500 INJECTION, POWDER, FOR SOLUTION INTRAMUSCULAR; INTRAVENOUS at 16:36

## 2020-06-10 RX ADMIN — Medication 400 MILLIGRAM(S): at 20:27

## 2020-06-10 RX ADMIN — Medication 325 MILLIGRAM(S): at 01:39

## 2020-06-10 RX ADMIN — Medication 1 MILLIGRAM(S): at 23:03

## 2020-06-10 RX ADMIN — PIPERACILLIN AND TAZOBACTAM 200 GRAM(S): 4; .5 INJECTION, POWDER, LYOPHILIZED, FOR SOLUTION INTRAVENOUS at 22:23

## 2020-06-10 NOTE — PROGRESS NOTE ADULT - ATTENDING COMMENTS
patient with improvement in right sided flank pain  still spiking fevers  will continue to monitor fever curve- continue IV ceftriaxone for now, if continues to have high 102-104 fevers would like to broaden to IV zosyn  will consult urology for patient's horseshoe kidney and r sided hydro iso no clear obstruction seen on imaging  continue IV abx for pyelo   dispo planning to dc home patient with improvement in right sided flank pain  still spiking fevers  will continue to monitor fever curve- continue IV ceftriaxone for now, if continues to have high 102-104 fevers would like to broaden to IV zosyn  f/u US kidneys for further evaluation   will consult urology for patient's horseshoe kidney and r sided hydro iso no clear obstruction seen on imaging  continue IV abx for pyelo   dispo planning to dc home

## 2020-06-10 NOTE — PROGRESS NOTE ADULT - PROBLEM SELECTOR PLAN 6
reports early menopause. reports stopped having periods around 39 years old. with fhx also with early menopause. Also w/ dyspareunia and vaginal dryness.  -f/u collateral from OB vs consult and set up outpatient  -Requesting outpatient referral for gyn for vaginal dryness Reports early menopause. Stopped having periods around 39 years old. +Family history for early menopause. Also w/ dyspareunia and vaginal dryness since going through menopause.  - make sure patient has Ob/Gyn follow up at the time of discharge

## 2020-06-10 NOTE — PROGRESS NOTE ADULT - PROBLEM SELECTOR PLAN 5
Glucose >130 on admission  -f/u A1C    ADDENDUM: likely stress related hyperglycemia in setting of infection, monitor glucose, check a1c Glucose >130 on admission. A1c 5.1%. Likely in the setting of infection.  - monitor FSG

## 2020-06-10 NOTE — PROGRESS NOTE ADULT - SUBJECTIVE AND OBJECTIVE BOX
O/N Events: febrile to 101.2 F, given Tylenol and ice packs, re-checked temp and had worsening fever to 104 F with SBP in the 80s, then given  cc bolus, underwent CT ab/pelvis non-con which showed horseshoe kidney, right hydronephrosis, findings consistent with pyelonephritis and no evidence of abscess. Rechecked vitals again and patient had defervesced and SBP increased to 90s.    Subjective: Patient seen at the bedside. Complaining of being "freezing" cold and having chills. Slept poorly. Endorsing nausea and would like Klonipin because is feeling anxious. Not in pain. One episode of watery diarrhea this morning. Drinking water.    VITALS  Vital Signs Last 24 Hrs  T(C): 39.8 (10 Emery 2020 09:04), Max: 40 (10 Emery 2020 01:15)  T(F): 103.7 (10 Emery 2020 09:04), Max: 104 (10 Emery 2020 01:15)  HR: 110 (10 Emery 2020 09:04) (84 - 123)  BP: 107/70 (10 Emery 2020 09:04) (81/54 - 123/68)  BP(mean): --  RR: 18 (10 Emery 2020 09:04) (16 - 18)  SpO2: 98% (10 Emery 2020 09:04) (96% - 99%)    CAPILLARY BLOOD GLUCOSE          PHYSICAL EXAM  General appearance: lying curled up on side with blankets pulled up to chin, tired and uncomfortable appearing  HEENT: MMM  Respiratory: breath sounds clear to auscultation throughout all lung fields, no accessory muscle use, no wheezing/rales/rhonchi   Cardiovascular: increased rate and regular rhythm  Gastrointestinal: soft, RUQ tenderness to palpation, non-distended, normoactive bowel sounds  Back: +right CVA tenderness  Extremities: WWP, no lower extremity edema  Neurological: alert and oriented    MEDICATIONS  (STANDING):  cefTRIAXone   IVPB 2000 milliGRAM(s) IV Intermittent every 24 hours  enoxaparin Injectable 40 milliGRAM(s) SubCutaneous every 24 hours  sertraline 100 milliGRAM(s) Oral daily  sodium chloride 0.9%. 1000 milliLiter(s) (75 mL/Hr) IV Continuous <Continuous>    MEDICATIONS  (PRN):  acetaminophen   Tablet .. 975 milliGRAM(s) Oral every 6 hours PRN Temp greater or equal to 38C (100.4F), Mild Pain (1 - 3)  clonazePAM  Tablet 1 milliGRAM(s) Oral every 8 hours PRN anxiety      No Known Allergies      LABS                        10.2   10.32 )-----------( 132      ( 10 Emery 2020 06:25 )             31.0     06-10    140  |  107  |  3<L>  ----------------------------<  120<H>  3.7   |  23  |  0.60    Ca    8.1<L>      10 Emery 2020 06:25  Phos  1.9     06-10  Mg     1.8     06-10    TPro  6.8  /  Alb  3.5  /  TBili  0.5  /  DBili  x   /  AST  17  /  ALT  18  /  AlkPhos  71  06-09      Urinalysis Basic - ( 2020 15:16 )    Color: Yellow / Appearance: Turbid / S.020 / pH: x  Gluc: x / Ketone: NEGATIVE  / Bili: Negative / Urobili: 1.0 E.U./dL   Blood: x / Protein: 100 mg/dL / Nitrite: NEGATIVE   Leuk Esterase: Large / RBC: 5-10 /HPF / WBC Many /HPF   Sq Epi: x / Non Sq Epi: 0-5 /HPF / Bacteria: Present /HPF            PROCEDURES/IMAGING: reviewed. O/N Events: febrile to 101.2 F, given Tylenol and ice packs, re-checked temp and had worsening fever to 104 F with SBP in the 80s, then given  cc bolus, underwent CT ab/pelvis non-con which showed horseshoe kidney, right hydronephrosis, findings consistent with pyelonephritis and no evidence of abscess. Rechecked vitals again and patient had defervesced and SBP increased to 90s.    Subjective: Patient seen at the bedside. Complaining of being "freezing" cold and having chills. Slept poorly. Endorsing nausea and would like Klonipin because is feeling anxious. Not in pain. One episode of watery diarrhea this morning. Drinking water.    VITALS  Vital Signs Last 24 Hrs  T(C): 39.8 (10 Emery 2020 09:04), Max: 40 (10 Emery 2020 01:15)  T(F): 103.7 (10 Emery 2020 09:04), Max: 104 (10 Emery 2020 01:15)  HR: 110 (10 Emery 2020 09:04) (84 - 123)  BP: 107/70 (10 Emery 2020 09:04) (81/54 - 123/68)  BP(mean): --  RR: 18 (10 Emery 2020 09:04) (16 - 18)  SpO2: 98% (10 Emery 2020 09:04) (96% - 99%)    PHYSICAL EXAM  General appearance: lying curled up on side with blankets pulled up to chin, tired and uncomfortable appearing  HEENT: MMM  Respiratory: breath sounds clear to auscultation throughout all lung fields, no accessory muscle use, no wheezing/rales/rhonchi   Cardiovascular: increased rate and regular rhythm  Gastrointestinal: soft, RUQ tenderness to palpation, non-distended, normoactive bowel sounds  Back: +right CVA tenderness  Extremities: WWP, no lower extremity edema  Neurological: alert and oriented    MEDICATIONS  (STANDING):  cefTRIAXone   IVPB 2000 milliGRAM(s) IV Intermittent every 24 hours  enoxaparin Injectable 40 milliGRAM(s) SubCutaneous every 24 hours  sertraline 100 milliGRAM(s) Oral daily  sodium chloride 0.9%. 1000 milliLiter(s) (75 mL/Hr) IV Continuous <Continuous>    MEDICATIONS  (PRN):  acetaminophen   Tablet .. 975 milliGRAM(s) Oral every 6 hours PRN Temp greater or equal to 38C (100.4F), Mild Pain (1 - 3)  clonazePAM  Tablet 1 milliGRAM(s) Oral every 8 hours PRN anxiety      No Known Allergies      LABS                        10.2   10.32 )-----------( 132      ( 10 Emery 2020 06:25 )             31.0     06-10    140  |  107  |  3<L>  ----------------------------<  120<H>  3.7   |  23  |  0.60    Ca    8.1<L>      10 Emery 2020 06:25  Phos  1.9     06-10  Mg     1.8     06-10    TPro  6.8  /  Alb  3.5  /  TBili  0.5  /  DBili  x   /  AST  17  /  ALT  18  /  AlkPhos  71  06-09      Urinalysis Basic - ( 2020 15:16 )    Color: Yellow / Appearance: Turbid / S.020 / pH: x  Gluc: x / Ketone: NEGATIVE  / Bili: Negative / Urobili: 1.0 E.U./dL   Blood: x / Protein: 100 mg/dL / Nitrite: NEGATIVE   Leuk Esterase: Large / RBC: 5-10 /HPF / WBC Many /HPF   Sq Epi: x / Non Sq Epi: 0-5 /HPF / Bacteria: Present /HPF            PROCEDURES/IMAGING: reviewed.

## 2020-06-10 NOTE — PROGRESS NOTE ADULT - PROBLEM SELECTOR PLAN 4
UA with protein, likely 2/2 to infection. No Hx of DM (no glucose in urine either).   -recommend outpatient eval    #Hematuria  Likely 2/2 infection. Hb stable  -continue to monitor UA with protein, likely 2/2 to infection. No Hx of DM (no glucose in urine either).   - recommend outpatient eval    #Hematuria  Likely 2/2 infection. Hb stable  -continue to monitor

## 2020-06-10 NOTE — PROGRESS NOTE ADULT - PROBLEM SELECTOR PLAN 2
ADDENDUM: see above, on ceftriaxone, IVFs o/n in setting of low bp; followup ctxs    #horseshoe kidney seen on prelim CT a/p, followup official report Met sepsis criteria on admission. +Leukocytosis. UA+, UCx growing 35K CFU/mL gram negative rods, +right CVA tenderness, evidence of pyelonephritis on CT ab/pelvis non-con, difficult to assess for abscess given non-con.  - continue     #horseshoe kidney seen on prelim CT a/p, followup official report Met sepsis criteria on admission. +Leukocytosis. UA+, UCx growing 35K CFU/mL gram negative rods, +right CVA tenderness, evidence of pyelonephritis on CT ab/pelvis non-con, difficult to assess for abscess given non-con.  - continue Ceftriaxone 2 g daily (6/9-6/18)  - f/u retroperitoneal US    #Horseshoe Kidney  Seen on CT ab/pelvis this admission. Patient denies history of kidney infections or recurrent UTIs.  - consult urology

## 2020-06-10 NOTE — CONSULT NOTE ADULT - SUBJECTIVE AND OBJECTIVE BOX
Patient is a 44y old  Female who presents with a chief complaint of Fever (10 Emery 2020 07:34)      HPI:  Ms. Hirsch is 45yo post menopausal woman w/ depression and HPV who presents with intermittent vaginal pain and 4 days persistent fever. On 6/3 patient, with one episode of vaginal pain after intercourse with boyfriend. It resolved the next day. Then on 6/6 she noted fevers. The fever has been associated with Chills and body aches. She was initially tried treating her symptoms with Tylenol which did not help. Patient presented to a [facility] urgent care 6/8 with complaint of fever. She had a positive UA and was written for Macrobid 100mg BID for 7 days but she did not pick it up because of an issue with the prescription. Today, she noted a temp of 104, called that was not improved by tylenol so she came to the ED. Notes intermittent dyspareunia and vaginal dryness.     Recent COVID Contacts: Last Wednesday Brother In Law- had COVID 2 months ago- remained outside the whole time, no direct contact    Patient denies: Headache, CP, Cough, SOB, Abdominal pain, N/V/D, dysuria, urinary frequency, vaginal discharge, numbness or chills.                                                  -+-+-+-+-+-+  NOTE +-+-+-+-                Patient is as stated above,  team consulted on patient horseshoe kidneys, and right sided hydronephrosis. Patient      In the ED:  Temp 101.9 | HR 91-99 | BP 89-97/52-66 | RR 16-18 | 99% RA |  Notable Labs: WBC 14.67, Bands 18.4, Lactate 0.9  Imaging: CXR clear w/o infiltrate. No pulmonary edema.  EKG: Pending  Treatment Received: 1g CTX, 800mg Ibuprofen, 1L NS x2 (09 Jun 2020 18:29)      Vital Signs Last 24 Hrs  T(C): 39.8 (10 Emery 2020 09:04), Max: 40 (10 Emery 2020 01:15)  T(F): 103.7 (10 Emery 2020 09:04), Max: 104 (10 Emery 2020 01:15)  HR: 110 (10 Emery 2020 09:04) (84 - 123)  BP: 107/70 (10 Emery 2020 09:04) (81/54 - 123/68)  BP(mean): --  RR: 18 (10 Emery 2020 09:04) (16 - 18)  SpO2: 98% (10 Emery 2020 09:04) (96% - 99%)  I&O's Summary      PE:  Gen:  Abd:  ARMEN:  MICHELLE:    LABS:                        10.2   10.32 )-----------( 132      ( 10 Emery 2020 06:25 )             31.0     06-10    140  |  107  |  3<L>  ----------------------------<  120<H>  3.7   |  23  |  0.60    Ca    8.1<L>      10 Emery 2020 06:25  Phos  1.9     06-10  Mg     1.8     06-10    TPro  6.8  /  Alb  3.5  /  TBili  0.5  /  DBili  x   /  AST  17  /  ALT  18  /  AlkPhos  71  06-09      Cultures  Culture Results:   No growth at 12 hours (06-09 @ 17:28)  Culture Results:   No growth at 12 hours (06-09 @ 17:28)  Culture Results:   35,000 CFU/ml Gram Negative Rods  Identification and susceptibility to follow. (06-09 @ 16:48)      A/P Patient is a 44y old  Female who presents with a chief complaint of Fever (10 Emery 2020 07:34)      HPI:  Ms. Hirsch is 43yo post menopausal woman w/ depression and HPV who presents with intermittent vaginal pain and 4 days persistent fever. On 6/3 patient, with one episode of vaginal pain after intercourse with boyfriend. It resolved the next day. Then on 6/6 she noted fevers. The fever has been associated with Chills and body aches. She was initially tried treating her symptoms with Tylenol which did not help. Patient presented to a [facility] urgent care 6/8 with complaint of fever. She had a positive UA and was written for Macrobid 100mg BID for 7 days but she did not pick it up because of an issue with the prescription. Today, she noted a temp of 104, called that was not improved by tylenol so she came to the ED. Notes intermittent dyspareunia and vaginal dryness.     Recent COVID Contacts: Last Wednesday Brother In Law- had COVID 2 months ago- remained outside the whole time, no direct contact    Patient denies: Headache, CP, Cough, SOB, Abdominal pain, N/V/D, dysuria, urinary frequency, vaginal discharge, numbness or chills.                                                  -+-+-+-+-+-+  NOTE +-+-+-+-                Patient is as stated above,  team consulted on patient horseshoe kidneys, and right sided hydronephrosis. Patient states that she has been menopausal since 39 and recently has been experiencing dyspareunia and post coital dyspareunia.  Patient states she had 1 day of increased urine frequency (6/13) but soon had pain subside and urine frequency return to normal. Patient at this time started to feel feverish so went to an Urgent Care.  Patient was given a prescription for Macrobid but did not get it from pharmacy due to insurance issues.  Patient denies n/v, abdominal pain, dysuria, hematuria, malodorous output, diarrhea, constipation.      In the ED:  Temp 101.9 | HR 91-99 | BP 89-97/52-66 | RR 16-18 | 99% RA |  Notable Labs: WBC 14.67, Bands 18.4, Lactate 0.9  Imaging: CXR clear w/o infiltrate. No pulmonary edema.  EKG: Pending  Treatment Received: 1g CTX, 800mg Ibuprofen, 1L NS x2 (09 Jun 2020 18:29)      Vital Signs Last 24 Hrs  T(C): 39.8 (10 Emery 2020 09:04), Max: 40 (10 Emery 2020 01:15)  T(F): 103.7 (10 Emery 2020 09:04), Max: 104 (10 Emery 2020 01:15)  HR: 110 (10 Emery 2020 09:04) (84 - 123)  BP: 107/70 (10 Emery 2020 09:04) (81/54 - 123/68)  BP(mean): --  RR: 18 (10 Emery 2020 09:04) (16 - 18)  SpO2: 98% (10 Emery 2020 09:04) (96% - 99%)  I&O's Summary      PE:  Gen: NAD, alert and oriented x 3  Abd: soft nt/nd. Negative CVAT B/L  : Negative SP tenderness. Patient states she is voiding well.       LABS:                        10.2   10.32 )-----------( 132      ( 10 Emery 2020 06:25 )             31.0     06-10    140  |  107  |  3<L>  ----------------------------<  120<H>  3.7   |  23  |  0.60    Ca    8.1<L>      10 Emery 2020 06:25  Phos  1.9     06-10  Mg     1.8     06-10    TPro  6.8  /  Alb  3.5  /  TBili  0.5  /  DBili  x   /  AST  17  /  ALT  18  /  AlkPhos  71  06-09      Cultures  Culture Results:   No growth at 12 hours (06-09 @ 17:28)  Culture Results:   No growth at 12 hours (06-09 @ 17:28)  Culture Results:   35,000 CFU/ml Gram Negative Rods  Identification and susceptibility to follow. (06-09 @ 16:48)      A/P

## 2020-06-10 NOTE — PROGRESS NOTE ADULT - PROBLEM SELECTOR PLAN 9
F: none  E: replete K <4, Mg <2  N: Regular  VTE: Padua <4 SCDs  GI PPx: Not indicated  Sleep Aid: None  Dispo: RMF  Code: Full    1) PCP   2) Date of Contact with PCP: o/n admission  3) PCP Contacted at Discharge: TBD  4) Summary of Handoff Given to PCP: TBD  5) Post-Discharge Appointment Date and Location: TBD F: NS @ 75 cc/hr  E: replete K <4, Mg <2  N: Regular  VTE: Padua <4, SCDs    Code Status: FULL CODE    Dispo: RMF, pending improvement of fevers on antibiotics

## 2020-06-10 NOTE — PROGRESS NOTE ADULT - PROBLEM SELECTOR PLAN 7
Follows w/ jeromy johnson- [phone]  -continue home sertraline 100mg  -Clonazepam 1mg PRN TID Follows rachel/ jeromy johnson - [phone].  - continue home Sertraline 100 mg daily  - continue Clonazepam 1 mg TID PRN

## 2020-06-10 NOTE — PROGRESS NOTE ADULT - PROBLEM SELECTOR PLAN 1
On admission meeting 3/4 SIRS (HR>90, WBC>12, Temp >100.4) with Bands 18.4, Lactate 0.9. Soft pressures but oriented and w/o evidence of end organ damage. Pregnancy test neg. COVID neg. CXR clear, with no open wounds. UA+ with high fever concerning for pyelonephritis. Patient with reported Hx of HPV and vaginal pain so PID is on differential  -s/p 2L NS & 1g CTX in ed  -c/w CTX 2g q24 for 10 days (End June 18)  -f/u RP u/s, consider transvaginal if pain persists or reoccurs  -f/u BCx- patient may fever in first 48 hours after pyelo, does not need immediate reculture unless HD unstable  -f/u UCx  -f/u HIV and other STI screening  -tylenol 650mg q6 PRN for fever On admission meeting 3/4 SIRS (HR>90, WBC>12, Temp >100.4) with Bands 18.4, Lactate 0.9. Soft pressures but oriented and w/o evidence of end organ damage. +Right CVA tenderness on exam, no urinary symptoms. Clinical picture and imaging consistent with pyelonephritis. BCx NGTD, HIV negative. UCx growing 35K CFU/mL gram negative rods.  - continue Ceftriaxone 2 g daily (6/9-6/18)  - f/u US of retroperitoneum  - monitor vitals given intermittent, high fevers  - continue Tylenol 975 mg q6h PRN for fever  - continue maintenance fluids: NS @ 75 cc/hr

## 2020-06-10 NOTE — PROGRESS NOTE ADULT - ASSESSMENT
44F with PMH of depression, HPV, and early menopause who presented with intermittent vaginal pain and 4 days persistent fever, found to have right CVA tenderness and evidence of right pyelonephritis on imaging, now on antibiotics and pending further evaluation.    INCMOPLETE PLAN 44F with PMH of depression, HPV, and early menopause who presented with intermittent vaginal pain and 4 days persistent fever, met sepsis criteria 2/2 to right pyelonephritis seen on imaging, now on antibiotics and admitted to the [facility] for further management.    INCMOPLETE PLAN 44F with PMH of depression, HPV, and early menopause who presented with intermittent vaginal pain and 4 days persistent fever, met sepsis criteria 2/2 to right pyelonephritis seen on imaging, now on antibiotics and admitted to the [facility] for further management.

## 2020-06-11 DIAGNOSIS — R78.81 BACTEREMIA: ICD-10-CM

## 2020-06-11 LAB
-  AMPICILLIN/SULBACTAM: SIGNIFICANT CHANGE UP
-  AMPICILLIN: SIGNIFICANT CHANGE UP
-  CEFAZOLIN: SIGNIFICANT CHANGE UP
-  CEFTRIAXONE: SIGNIFICANT CHANGE UP
-  CIPROFLOXACIN: SIGNIFICANT CHANGE UP
-  GENTAMICIN: SIGNIFICANT CHANGE UP
-  NITROFURANTOIN: SIGNIFICANT CHANGE UP
-  PIPERACILLIN/TAZOBACTAM: SIGNIFICANT CHANGE UP
-  TOBRAMYCIN: SIGNIFICANT CHANGE UP
-  TRIMETHOPRIM/SULFAMETHOXAZOLE: SIGNIFICANT CHANGE UP
ANION GAP SERPL CALC-SCNC: 11 MMOL/L — SIGNIFICANT CHANGE UP (ref 5–17)
APTT BLD: 32.5 SEC — SIGNIFICANT CHANGE UP (ref 27.5–36.3)
BUN SERPL-MCNC: 4 MG/DL — LOW (ref 7–23)
C TRACH RRNA SPEC QL NAA+PROBE: SIGNIFICANT CHANGE UP
CALCIUM SERPL-MCNC: 8.3 MG/DL — LOW (ref 8.4–10.5)
CHLORIDE SERPL-SCNC: 104 MMOL/L — SIGNIFICANT CHANGE UP (ref 96–108)
CO2 SERPL-SCNC: 25 MMOL/L — SIGNIFICANT CHANGE UP (ref 22–31)
CREAT SERPL-MCNC: 0.55 MG/DL — SIGNIFICANT CHANGE UP (ref 0.5–1.3)
CULTURE RESULTS: SIGNIFICANT CHANGE UP
GLUCOSE SERPL-MCNC: 104 MG/DL — HIGH (ref 70–99)
HCT VFR BLD CALC: 32.3 % — LOW (ref 34.5–45)
HGB BLD-MCNC: 10.5 G/DL — LOW (ref 11.5–15.5)
INR BLD: 1.14 — SIGNIFICANT CHANGE UP (ref 0.88–1.16)
MAGNESIUM SERPL-MCNC: 2.1 MG/DL — SIGNIFICANT CHANGE UP (ref 1.6–2.6)
MCHC RBC-ENTMCNC: 29.2 PG — SIGNIFICANT CHANGE UP (ref 27–34)
MCHC RBC-ENTMCNC: 32.5 GM/DL — SIGNIFICANT CHANGE UP (ref 32–36)
MCV RBC AUTO: 90 FL — SIGNIFICANT CHANGE UP (ref 80–100)
METHOD TYPE: SIGNIFICANT CHANGE UP
N GONORRHOEA RRNA SPEC QL NAA+PROBE: SIGNIFICANT CHANGE UP
NRBC # BLD: 0 /100 WBCS — SIGNIFICANT CHANGE UP (ref 0–0)
ORGANISM # SPEC MICROSCOPIC CNT: SIGNIFICANT CHANGE UP
ORGANISM # SPEC MICROSCOPIC CNT: SIGNIFICANT CHANGE UP
PHOSPHATE SERPL-MCNC: 2.2 MG/DL — LOW (ref 2.5–4.5)
PLATELET # BLD AUTO: 156 K/UL — SIGNIFICANT CHANGE UP (ref 150–400)
POTASSIUM SERPL-MCNC: 3.2 MMOL/L — LOW (ref 3.5–5.3)
POTASSIUM SERPL-SCNC: 3.2 MMOL/L — LOW (ref 3.5–5.3)
PROTHROM AB SERPL-ACNC: 13 SEC — HIGH (ref 10–12.9)
RBC # BLD: 3.59 M/UL — LOW (ref 3.8–5.2)
RBC # FLD: 13.2 % — SIGNIFICANT CHANGE UP (ref 10.3–14.5)
SODIUM SERPL-SCNC: 140 MMOL/L — SIGNIFICANT CHANGE UP (ref 135–145)
SPECIMEN SOURCE: SIGNIFICANT CHANGE UP
SPECIMEN SOURCE: SIGNIFICANT CHANGE UP
WBC # BLD: 8.19 K/UL — SIGNIFICANT CHANGE UP (ref 3.8–10.5)
WBC # FLD AUTO: 8.19 K/UL — SIGNIFICANT CHANGE UP (ref 3.8–10.5)

## 2020-06-11 PROCEDURE — 99233 SBSQ HOSP IP/OBS HIGH 50: CPT | Mod: GC

## 2020-06-11 PROCEDURE — 78707 K FLOW/FUNCT IMAGE W/O DRUG: CPT | Mod: 26

## 2020-06-11 RX ORDER — POTASSIUM PHOSPHATE, MONOBASIC POTASSIUM PHOSPHATE, DIBASIC 236; 224 MG/ML; MG/ML
15 INJECTION, SOLUTION INTRAVENOUS ONCE
Refills: 0 | Status: COMPLETED | OUTPATIENT
Start: 2020-06-11 | End: 2020-06-11

## 2020-06-11 RX ORDER — IBUPROFEN 200 MG
400 TABLET ORAL EVERY 6 HOURS
Refills: 0 | Status: DISCONTINUED | OUTPATIENT
Start: 2020-06-11 | End: 2020-06-12

## 2020-06-11 RX ORDER — POTASSIUM CHLORIDE 20 MEQ
40 PACKET (EA) ORAL EVERY 4 HOURS
Refills: 0 | Status: COMPLETED | OUTPATIENT
Start: 2020-06-11 | End: 2020-06-11

## 2020-06-11 RX ORDER — POTASSIUM CHLORIDE 20 MEQ
40 PACKET (EA) ORAL EVERY 4 HOURS
Refills: 0 | Status: DISCONTINUED | OUTPATIENT
Start: 2020-06-11 | End: 2020-06-11

## 2020-06-11 RX ORDER — PIPERACILLIN AND TAZOBACTAM 4; .5 G/20ML; G/20ML
3.38 INJECTION, POWDER, LYOPHILIZED, FOR SOLUTION INTRAVENOUS EVERY 6 HOURS
Refills: 0 | Status: DISCONTINUED | OUTPATIENT
Start: 2020-06-11 | End: 2020-06-12

## 2020-06-11 RX ADMIN — Medication 975 MILLIGRAM(S): at 22:07

## 2020-06-11 RX ADMIN — PIPERACILLIN AND TAZOBACTAM 200 GRAM(S): 4; .5 INJECTION, POWDER, LYOPHILIZED, FOR SOLUTION INTRAVENOUS at 03:13

## 2020-06-11 RX ADMIN — Medication 40 MILLIEQUIVALENT(S): at 11:43

## 2020-06-11 RX ADMIN — POTASSIUM PHOSPHATE, MONOBASIC POTASSIUM PHOSPHATE, DIBASIC 63.75 MILLIMOLE(S): 236; 224 INJECTION, SOLUTION INTRAVENOUS at 10:12

## 2020-06-11 RX ADMIN — Medication 400 MILLIGRAM(S): at 14:26

## 2020-06-11 RX ADMIN — PIPERACILLIN AND TAZOBACTAM 200 GRAM(S): 4; .5 INJECTION, POWDER, LYOPHILIZED, FOR SOLUTION INTRAVENOUS at 10:12

## 2020-06-11 RX ADMIN — SERTRALINE 100 MILLIGRAM(S): 25 TABLET, FILM COATED ORAL at 11:43

## 2020-06-11 RX ADMIN — PIPERACILLIN AND TAZOBACTAM 200 GRAM(S): 4; .5 INJECTION, POWDER, LYOPHILIZED, FOR SOLUTION INTRAVENOUS at 16:19

## 2020-06-11 RX ADMIN — Medication 40 MILLIEQUIVALENT(S): at 13:56

## 2020-06-11 RX ADMIN — Medication 975 MILLIGRAM(S): at 08:58

## 2020-06-11 RX ADMIN — PIPERACILLIN AND TAZOBACTAM 200 GRAM(S): 4; .5 INJECTION, POWDER, LYOPHILIZED, FOR SOLUTION INTRAVENOUS at 22:06

## 2020-06-11 RX ADMIN — Medication 1 MILLIGRAM(S): at 19:30

## 2020-06-11 RX ADMIN — ENOXAPARIN SODIUM 40 MILLIGRAM(S): 100 INJECTION SUBCUTANEOUS at 19:21

## 2020-06-11 RX ADMIN — Medication 1 MILLIGRAM(S): at 10:36

## 2020-06-11 NOTE — PROGRESS NOTE ADULT - PROBLEM SELECTOR PLAN 1
On admission meeting 3/4 SIRS (HR>90, WBC>12, Temp >100.4) with bands 18.4, lactate 0.9. +Right CVA tenderness on exam, no urinary symptoms. Clinical picture and imaging consistent with pyelonephritis. HIV negative. UCx growing 35K CFU/mL E. coli. BCx growing gram negative rods.  - continue Ceftriaxone 2 g daily (6/9-6/18)  - monitor vitals given intermittent, high fevers  - continue Tylenol 975 mg q6h PRN for fever  - continue maintenance fluids: NS @ 75 cc/hr On admission meeting 3/4 SIRS (HR>90, WBC>12, Temp >100.4) with bands 18.4, lactate 0.9. +Right CVA tenderness on exam, no urinary symptoms. Clinical picture and imaging consistent with pyelonephritis. HIV negative. UCx growing 35K CFU/mL E. coli. BCx growing E. coli.  - discontinued Ceftriaxone 2 g daily (6/9-6/10) given persistent fevers  - continue Zosyn 3.375 g IV q6h (6/10-)  - monitor vitals given intermittent, high fevers  - continue Tylenol 975 mg q6h PRN for fever

## 2020-06-11 NOTE — PROGRESS NOTE ADULT - ASSESSMENT
44F with PMH of depression, HPV, and early menopause who presented with intermittent vaginal pain and 4 days persistent fever, met sepsis criteria 2/2 to right pyelonephritis seen on imaging, now on antibiotics and admitted to the [facility] for further management.    INCOMPLETE PLAN 44F with PMH of depression, HPV, and early menopause who presented with intermittent vaginal pain and 4 days persistent fever, met sepsis criteria 2/2 to right pyelonephritis seen on imaging, now on antibiotics and admitted to the [facility] for further management.

## 2020-06-11 NOTE — PROGRESS NOTE ADULT - PROBLEM SELECTOR PLAN 2
Met sepsis criteria on admission. +Leukocytosis. UA+, UCx growing 35K CFU/mL gram negative rods, +right CVA tenderness, evidence of pyelonephritis on CT ab/pelvis non-con, difficult to assess for abscess given non-con.  - continue Ceftriaxone 2 g daily (6/9-6/18)  - f/u retroperitoneal US    #Horseshoe Kidney  Seen on CT ab/pelvis this admission. Patient denies history of kidney infections or recurrent UTIs.  - consult urology Met sepsis criteria on admission. +Leukocytosis. UA+, UCx growing 35K CFU/mL gram negative rods, +right CVA tenderness, evidence of pyelonephritis on CT ab/pelvis non-con, no abscess on CTAP w/ con.  - discontinued Ceftriaxone 2 g daily (6/9-6/10) given persistent fevers  - continue Zosyn 3.375 g IV q6h (6/10-)  - f/u urology recs    #Horseshoe Kidney  Seen on CT ab/pelvis this admission. Patient denies history of kidney infections or recurrent UTIs.  - f/u urology recs Met sepsis criteria on admission. +Leukocytosis. UA+, UCx growing 35K CFU/mL gram negative rods, +right CVA tenderness, evidence of pyelonephritis on CT ab/pelvis non-con, no abscess on CTAP w/ con.  - discontinued Ceftriaxone 2 g daily (6/9-6/10) given persistent fevers  - continue Zosyn 3.375 g IV q6h (6/10-)  - f/u urology recs  - f/u NM renal scan to evaluate kidney function  #Horseshoe Kidney  Seen on CT ab/pelvis this admission. Patient denies history of kidney infections or recurrent UTIs.  - f/u urology recs

## 2020-06-11 NOTE — PROGRESS NOTE ADULT - PROBLEM SELECTOR PLAN 6
Reports early menopause. Stopped having periods around 39 years old. +Family history for early menopause. Also w/ dyspareunia and vaginal dryness since going through menopause.  - make sure patient has Ob/Gyn follow up at the time of discharge

## 2020-06-11 NOTE — PROGRESS NOTE ADULT - ASSESSMENT
Assessment: 44F PMH of depression, HPV, and early menopause who presented with intermittent vaginal pain and 4 days persistent fever, met sepsis criteria 2/2 to right pyelonephritis. 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    Recommendations:  - NM renal scan to assess function of R kidney  - Continue treatment E. coli UTI per primary team  - Continue care per primary team  - No urologic surgery intervention at this time  - Follow up with Dr. Kirk within 2 weeks of discharge

## 2020-06-11 NOTE — PROGRESS NOTE ADULT - PROBLEM SELECTOR PLAN 3
Na 133 on admission. Likely hypovolemic hyponatremia in setting of poor PO while sick  -trend BMP, if persists or worsens would recommend evaluation of urine lytes and osm RESOLVED. Na 133 on admission. Likely hypovolemic hyponatremia in setting of poor PO while sick.  - monitor BMP Patient found to have E. coli growing in her blood - both bottles, aerobic and anaerobic. Continuing to spike fevers so broadened antibiotics from Ceftriaxone to Zosyn.  - discontinued Ceftriaxone 2 g daily (6/9-6/10) given persistent fevers  - continue Zosyn 3.375 g IV q6h (6/10-)  - f/u surveillance BCx (6/10)

## 2020-06-11 NOTE — PROGRESS NOTE ADULT - SUBJECTIVE AND OBJECTIVE BOX
SUBJECTIVE: Resting comfortably in bed without complaints        OBJECTIVE:    Vital Signs:  Vital Signs Last 24 Hrs  T(C): 39 (2020 09:04), Max: 39.4 (10 Emery 2020 18:16)  T(F): 102.2 (2020 09:04), Max: 103 (10 Emery 2020 18:16)  HR: 87 (2020 09:04) (70 - 112)  BP: 114/73 (2020 09:04) (90/60 - 133/75)  BP(mean): --  RR: 18 (2020 09:04) (17 - 20)  SpO2: 97% (2020 09:04) (96% - 99%)    Physical Exam:  General: NAD  Pulmonary: Nonlabored breathing, no respiratory distress  Abdominal: No suprapubic tenderness  : R CVA tenderness to palpation, no L CVA tenderness    Ins and Outs:  I&O's Summary      Labs:                        10.5   8.19  )-----------( 156      ( 2020 06:12 )             32.3     06-11    140  |  104  |  4<L>  ----------------------------<  104<H>  3.2<L>   |  25  |  0.55    Ca    8.3<L>      2020 06:12  Phos  2.2     06-11  Mg     2.1     06-11    TPro  6.8  /  Alb  3.5  /  TBili  0.5  /  DBili  x   /  AST  17  /  ALT  18  /  AlkPhos  71  06-09    PT/INR - ( 2020 06:13 )   PT: 13.0 sec;   INR: 1.14          PTT - ( 2020 06:13 )  PTT:32.5 sec  Urinalysis Basic - ( 2020 15:16 )    Color: Yellow / Appearance: Turbid / S.020 / pH: x  Gluc: x / Ketone: NEGATIVE  / Bili: Negative / Urobili: 1.0 E.U./dL   Blood: x / Protein: 100 mg/dL / Nitrite: NEGATIVE   Leuk Esterase: Large / RBC: 5-10 /HPF / WBC Many /HPF   Sq Epi: x / Non Sq Epi: 0-5 /HPF / Bacteria: Present /HPF      CAPILLARY BLOOD GLUCOSE        LIVER FUNCTIONS - ( 2020 15:00 )  Alb: 3.5 g/dL / Pro: 6.8 g/dL / ALK PHOS: 71 U/L / ALT: 18 U/L / AST: 17 U/L / GGT: x

## 2020-06-11 NOTE — PROGRESS NOTE ADULT - SUBJECTIVE AND OBJECTIVE BOX
O/N Events: CT ab/pelvis with contrast completed, no abscess, urology called - nothing urgent to be done    Interval Events/Subjective: Patient is continuing to spike high temperatures to ~103, most recently this morning. Defervesce with Tylenol 1 g. Has remained hemodynamically stable. Subjectively the patient feels better and reports sleeping better overnight. No further episodes of emesis. No diarrhea.    VITALS  Vital Signs Last 24 Hrs  T(C): 39 (2020 09:04), Max: 39.4 (10 Emery 2020 18:16)  T(F): 102.2 (2020 09:04), Max: 103 (10 Emery 2020 18:16)  HR: 87 (2020 09:04) (70 - 112)  BP: 114/73 (2020 09:04) (90/60 - 133/75)  BP(mean): --  RR: 18 (2020 09:04) (17 - 20)  SpO2: 97% (2020 09:04) (96% - 99%)    CAPILLARY BLOOD GLUCOSE          PHYSICAL EXAM  General appearance: lying in bed, sleepy but alert and pleasant  HEENT: EOMI, sclera anicteric  Respiratory: breath sounds clear to auscultation throughout all lung fields, no accessory muscle use, no wheezing/rales/rhonchi   Cardiovascular: increased rate and rhythm  Gastrointestinal: soft, non-tender, non-distended, normoactive bowel sounds  Extremities: +right CVA tenderness (stable)  Neurological: AOx3    MEDICATIONS  (STANDING):  enoxaparin Injectable 40 milliGRAM(s) SubCutaneous every 24 hours  piperacillin/tazobactam IVPB.. 3.375 Gram(s) IV Intermittent every 6 hours  potassium chloride    Tablet ER 40 milliEquivalent(s) Oral every 4 hours  sertraline 100 milliGRAM(s) Oral daily    MEDICATIONS  (PRN):  acetaminophen   Tablet .. 975 milliGRAM(s) Oral every 6 hours PRN Temp greater or equal to 38C (100.4F), Mild Pain (1 - 3)  clonazePAM  Tablet 1 milliGRAM(s) Oral every 8 hours PRN anxiety      No Known Allergies      LABS                        10.5   8.19  )-----------( 156      ( 2020 06:12 )             32.3     06-11    140  |  104  |  4<L>  ----------------------------<  104<H>  3.2<L>   |  25  |  0.55    Ca    8.3<L>      2020 06:12  Phos  2.2       Mg     2.1     11    TPro  6.8  /  Alb  3.5  /  TBili  0.5  /  DBili  x   /  AST  17  /  ALT  18  /  AlkPhos  71  06-09    PT/INR - ( 2020 06:13 )   PT: 13.0 sec;   INR: 1.14          PTT - ( 2020 06:13 )  PTT:32.5 sec  Urinalysis Basic - ( 2020 15:16 )    Color: Yellow / Appearance: Turbid / S.020 / pH: x  Gluc: x / Ketone: NEGATIVE  / Bili: Negative / Urobili: 1.0 E.U./dL   Blood: x / Protein: 100 mg/dL / Nitrite: NEGATIVE   Leuk Esterase: Large / RBC: 5-10 /HPF / WBC Many /HPF   Sq Epi: x / Non Sq Epi: 0-5 /HPF / Bacteria: Present /HPF            PROCEDURES/IMAGING: reviewed.

## 2020-06-11 NOTE — PROGRESS NOTE ADULT - PROBLEM SELECTOR PLAN 9
F: NS @ 75 cc/hr  E: replete K <4, Mg <2  N: Regular  VTE: Padua <4, SCDs    Code Status: FULL CODE    Dispo: RMF, pending improvement of fevers on antibiotics F: none  E: replete K <4, Mg <2  N: Regular  VTE: Padua <4, SCDs    Code Status: FULL CODE    Dispo: RMF, pending improvement of fevers on antibiotics

## 2020-06-11 NOTE — PROGRESS NOTE ADULT - PROBLEM SELECTOR PLAN 5
Glucose >130 on admission. A1c 5.1%. Likely in the setting of infection.  - monitor FSG Glucose >130 on admission. A1c 5.1%. Likely in the setting of infection. Well-controlled currently.  - monitor FSG

## 2020-06-11 NOTE — PROGRESS NOTE ADULT - ATTENDING COMMENTS
patient looks much improved today   sitting in bed comfortably   normal s1, s2 no m/r/g  lungs ctab b/l   abd soft nontender, improved R. CVAT   no peripheral edema     patient's wbc improving but still high fevers  plan for NM scan this afternoon  plan to dc home tomorrow on PO abx

## 2020-06-11 NOTE — PROGRESS NOTE ADULT - PROBLEM SELECTOR PLAN 4
UA with protein, likely 2/2 to infection. No Hx of DM (no glucose in urine either).   - recommend outpatient eval    #Hematuria  Likely 2/2 infection. Hb stable  -continue to monitor UA with protein, likely 2/2 to infection. No Hx of DM (no glucose in urine either).   - recommend outpatient eval    #Hematuria  Likely 2/2 infection. Hb stable.  - continue to monitor

## 2020-06-11 NOTE — PROGRESS NOTE ADULT - PROBLEM SELECTOR PLAN 7
Follows rachel/ jeromy johnson - [phone].  - continue home Sertraline 100 mg daily  - continue Clonazepam 1 mg TID PRN

## 2020-06-12 ENCOUNTER — TRANSCRIPTION ENCOUNTER (OUTPATIENT)
Age: 45
End: 2020-06-12

## 2020-06-12 VITALS
HEART RATE: 76 BPM | SYSTOLIC BLOOD PRESSURE: 107 MMHG | TEMPERATURE: 98 F | DIASTOLIC BLOOD PRESSURE: 70 MMHG | RESPIRATION RATE: 16 BRPM | OXYGEN SATURATION: 98 %

## 2020-06-12 DIAGNOSIS — Z71.89 OTHER SPECIFIED COUNSELING: ICD-10-CM

## 2020-06-12 LAB
-  AMPICILLIN/SULBACTAM: SIGNIFICANT CHANGE UP
-  AMPICILLIN/SULBACTAM: SIGNIFICANT CHANGE UP
-  AMPICILLIN: SIGNIFICANT CHANGE UP
-  AMPICILLIN: SIGNIFICANT CHANGE UP
-  CEFAZOLIN: SIGNIFICANT CHANGE UP
-  CEFAZOLIN: SIGNIFICANT CHANGE UP
-  CEFTRIAXONE: SIGNIFICANT CHANGE UP
-  CEFTRIAXONE: SIGNIFICANT CHANGE UP
-  CIPROFLOXACIN: SIGNIFICANT CHANGE UP
-  CIPROFLOXACIN: SIGNIFICANT CHANGE UP
-  GENTAMICIN: SIGNIFICANT CHANGE UP
-  GENTAMICIN: SIGNIFICANT CHANGE UP
-  PIPERACILLIN/TAZOBACTAM: SIGNIFICANT CHANGE UP
-  PIPERACILLIN/TAZOBACTAM: SIGNIFICANT CHANGE UP
-  TOBRAMYCIN: SIGNIFICANT CHANGE UP
-  TOBRAMYCIN: SIGNIFICANT CHANGE UP
-  TRIMETHOPRIM/SULFAMETHOXAZOLE: SIGNIFICANT CHANGE UP
-  TRIMETHOPRIM/SULFAMETHOXAZOLE: SIGNIFICANT CHANGE UP
ANION GAP SERPL CALC-SCNC: 11 MMOL/L — SIGNIFICANT CHANGE UP (ref 5–17)
BUN SERPL-MCNC: 6 MG/DL — LOW (ref 7–23)
CALCIUM SERPL-MCNC: 8.6 MG/DL — SIGNIFICANT CHANGE UP (ref 8.4–10.5)
CHLORIDE SERPL-SCNC: 109 MMOL/L — HIGH (ref 96–108)
CO2 SERPL-SCNC: 23 MMOL/L — SIGNIFICANT CHANGE UP (ref 22–31)
CREAT SERPL-MCNC: 0.59 MG/DL — SIGNIFICANT CHANGE UP (ref 0.5–1.3)
CULTURE RESULTS: SIGNIFICANT CHANGE UP
GLUCOSE SERPL-MCNC: 98 MG/DL — SIGNIFICANT CHANGE UP (ref 70–99)
MAGNESIUM SERPL-MCNC: 1.9 MG/DL — SIGNIFICANT CHANGE UP (ref 1.6–2.6)
METHOD TYPE: SIGNIFICANT CHANGE UP
METHOD TYPE: SIGNIFICANT CHANGE UP
ORGANISM # SPEC MICROSCOPIC CNT: SIGNIFICANT CHANGE UP
PHOSPHATE SERPL-MCNC: 2.6 MG/DL — SIGNIFICANT CHANGE UP (ref 2.5–4.5)
POTASSIUM SERPL-MCNC: 4.3 MMOL/L — SIGNIFICANT CHANGE UP (ref 3.5–5.3)
POTASSIUM SERPL-SCNC: 4.3 MMOL/L — SIGNIFICANT CHANGE UP (ref 3.5–5.3)
SODIUM SERPL-SCNC: 143 MMOL/L — SIGNIFICANT CHANGE UP (ref 135–145)
SPECIMEN SOURCE: SIGNIFICANT CHANGE UP

## 2020-06-12 PROCEDURE — 87040 BLOOD CULTURE FOR BACTERIA: CPT

## 2020-06-12 PROCEDURE — 83735 ASSAY OF MAGNESIUM: CPT

## 2020-06-12 PROCEDURE — 93005 ELECTROCARDIOGRAM TRACING: CPT

## 2020-06-12 PROCEDURE — 87086 URINE CULTURE/COLONY COUNT: CPT

## 2020-06-12 PROCEDURE — 85730 THROMBOPLASTIN TIME PARTIAL: CPT

## 2020-06-12 PROCEDURE — 78707 K FLOW/FUNCT IMAGE W/O DRUG: CPT

## 2020-06-12 PROCEDURE — 86850 RBC ANTIBODY SCREEN: CPT

## 2020-06-12 PROCEDURE — 83036 HEMOGLOBIN GLYCOSYLATED A1C: CPT

## 2020-06-12 PROCEDURE — A9562: CPT

## 2020-06-12 PROCEDURE — 86780 TREPONEMA PALLIDUM: CPT

## 2020-06-12 PROCEDURE — 81001 URINALYSIS AUTO W/SCOPE: CPT

## 2020-06-12 PROCEDURE — 87491 CHLMYD TRACH DNA AMP PROBE: CPT

## 2020-06-12 PROCEDURE — 87186 SC STD MICRODIL/AGAR DIL: CPT

## 2020-06-12 PROCEDURE — 74176 CT ABD & PELVIS W/O CONTRAST: CPT

## 2020-06-12 PROCEDURE — 80048 BASIC METABOLIC PNL TOTAL CA: CPT

## 2020-06-12 PROCEDURE — 87581 M.PNEUMON DNA AMP PROBE: CPT

## 2020-06-12 PROCEDURE — 76775 US EXAM ABDO BACK WALL LIM: CPT

## 2020-06-12 PROCEDURE — 87486 CHLMYD PNEUM DNA AMP PROBE: CPT

## 2020-06-12 PROCEDURE — 84100 ASSAY OF PHOSPHORUS: CPT

## 2020-06-12 PROCEDURE — 85027 COMPLETE CBC AUTOMATED: CPT

## 2020-06-12 PROCEDURE — 86901 BLOOD TYPING SEROLOGIC RH(D): CPT

## 2020-06-12 PROCEDURE — 87389 HIV-1 AG W/HIV-1&-2 AB AG IA: CPT

## 2020-06-12 PROCEDURE — 85025 COMPLETE CBC W/AUTO DIFF WBC: CPT

## 2020-06-12 PROCEDURE — 80053 COMPREHEN METABOLIC PANEL: CPT

## 2020-06-12 PROCEDURE — 87150 DNA/RNA AMPLIFIED PROBE: CPT

## 2020-06-12 PROCEDURE — 96374 THER/PROPH/DIAG INJ IV PUSH: CPT

## 2020-06-12 PROCEDURE — 87798 DETECT AGENT NOS DNA AMP: CPT

## 2020-06-12 PROCEDURE — 87591 N.GONORRHOEAE DNA AMP PROB: CPT

## 2020-06-12 PROCEDURE — 99285 EMERGENCY DEPT VISIT HI MDM: CPT | Mod: 25

## 2020-06-12 PROCEDURE — 36415 COLL VENOUS BLD VENIPUNCTURE: CPT

## 2020-06-12 PROCEDURE — 71045 X-RAY EXAM CHEST 1 VIEW: CPT

## 2020-06-12 PROCEDURE — 87633 RESP VIRUS 12-25 TARGETS: CPT

## 2020-06-12 PROCEDURE — 74177 CT ABD & PELVIS W/CONTRAST: CPT

## 2020-06-12 PROCEDURE — 85610 PROTHROMBIN TIME: CPT

## 2020-06-12 PROCEDURE — 87635 SARS-COV-2 COVID-19 AMP PRB: CPT

## 2020-06-12 PROCEDURE — 83605 ASSAY OF LACTIC ACID: CPT

## 2020-06-12 RX ORDER — AZTREONAM 2 G
1 VIAL (EA) INJECTION
Qty: 13 | Refills: 0
Start: 2020-06-12 | End: 2020-06-17

## 2020-06-12 RX ORDER — AZTREONAM 2 G
1 VIAL (EA) INJECTION
Qty: 7 | Refills: 0
Start: 2020-06-12 | End: 2020-06-14

## 2020-06-12 RX ORDER — AZTREONAM 2 G
1 VIAL (EA) INJECTION
Qty: 8 | Refills: 0
Start: 2020-06-12 | End: 2020-06-15

## 2020-06-12 RX ADMIN — PIPERACILLIN AND TAZOBACTAM 200 GRAM(S): 4; .5 INJECTION, POWDER, LYOPHILIZED, FOR SOLUTION INTRAVENOUS at 09:42

## 2020-06-12 RX ADMIN — SERTRALINE 100 MILLIGRAM(S): 25 TABLET, FILM COATED ORAL at 12:01

## 2020-06-12 RX ADMIN — PIPERACILLIN AND TAZOBACTAM 200 GRAM(S): 4; .5 INJECTION, POWDER, LYOPHILIZED, FOR SOLUTION INTRAVENOUS at 04:07

## 2020-06-12 RX ADMIN — Medication 975 MILLIGRAM(S): at 08:57

## 2020-06-12 RX ADMIN — Medication 1 MILLIGRAM(S): at 08:57

## 2020-06-12 NOTE — DISCHARGE NOTE NURSING/CASE MANAGEMENT/SOCIAL WORK - NSDCFUADDAPPT_GEN_ALL_CORE_FT
Please follow up with your Primary Care Provider, Dr. Ojeda, within 1-2 weeks after discharge.    Please follow up with Dr. Kirk (Urology) on Monday, June 22nd at 1:00 PM.

## 2020-06-12 NOTE — CHART NOTE - NSCHARTNOTEFT_GEN_A_CORE
Called by infectious disease pharmacist and told to have patient complete 14 day course of Bactrim. Patient was called on 6/12 to relay this information. Called by infectious disease pharmacist and told to have patient complete 14 day course of Bactrim. Patient was called on 6/12 and a message was left for the patient to  her remaining 4 days of Bactrim at [facility].

## 2020-06-12 NOTE — DISCHARGE NOTE NURSING/CASE MANAGEMENT/SOCIAL WORK - PATIENT PORTAL LINK FT
You can access the FollowMyHealth Patient Portal offered by [facility] by registering at the following website: [email or web address]. By joining [facility]’s FollowMyHealth portal, you will also be able to view your health information using other applications (apps) compatible with our system.

## 2020-06-15 DIAGNOSIS — Z78.0 ASYMPTOMATIC MENOPAUSAL STATE: ICD-10-CM

## 2020-06-15 DIAGNOSIS — Q63.1 LOBULATED, FUSED AND HORSESHOE KIDNEY: ICD-10-CM

## 2020-06-15 DIAGNOSIS — F41.9 ANXIETY DISORDER, UNSPECIFIED: ICD-10-CM

## 2020-06-15 DIAGNOSIS — N39.0 URINARY TRACT INFECTION, SITE NOT SPECIFIED: ICD-10-CM

## 2020-06-15 DIAGNOSIS — N13.30 UNSPECIFIED HYDRONEPHROSIS: ICD-10-CM

## 2020-06-15 DIAGNOSIS — N12 TUBULO-INTERSTITIAL NEPHRITIS, NOT SPECIFIED AS ACUTE OR CHRONIC: ICD-10-CM

## 2020-06-15 DIAGNOSIS — F12.99 CANNABIS USE, UNSPECIFIED WITH UNSPECIFIED CANNABIS-INDUCED DISORDER: ICD-10-CM

## 2020-06-15 DIAGNOSIS — F32.89 OTHER SPECIFIED DEPRESSIVE EPISODES: ICD-10-CM

## 2020-06-15 DIAGNOSIS — R80.9 PROTEINURIA, UNSPECIFIED: ICD-10-CM

## 2020-06-15 DIAGNOSIS — A41.9 SEPSIS, UNSPECIFIED ORGANISM: ICD-10-CM

## 2020-06-15 DIAGNOSIS — E87.1 HYPO-OSMOLALITY AND HYPONATREMIA: ICD-10-CM

## 2020-06-15 DIAGNOSIS — N10 ACUTE PYELONEPHRITIS: ICD-10-CM

## 2020-06-15 DIAGNOSIS — R73.9 HYPERGLYCEMIA, UNSPECIFIED: ICD-10-CM

## 2020-06-15 DIAGNOSIS — B96.20 UNSPECIFIED ESCHERICHIA COLI [E. COLI] AS THE CAUSE OF DISEASES CLASSIFIED ELSEWHERE: ICD-10-CM

## 2020-06-15 LAB
CULTURE RESULTS: SIGNIFICANT CHANGE UP
SPECIMEN SOURCE: SIGNIFICANT CHANGE UP

## 2020-06-15 PROCEDURE — G9001: CPT

## 2020-06-22 LAB
CULTURE RESULTS: SIGNIFICANT CHANGE UP
ORGANISM # SPEC MICROSCOPIC CNT: SIGNIFICANT CHANGE UP
ORGANISM # SPEC MICROSCOPIC CNT: SIGNIFICANT CHANGE UP
SPECIMEN SOURCE: SIGNIFICANT CHANGE UP

## 2020-08-13 ENCOUNTER — TRANSCRIPTION ENCOUNTER (OUTPATIENT)
Age: 45
End: 2020-08-13

## 2020-09-26 ENCOUNTER — TRANSCRIPTION ENCOUNTER (OUTPATIENT)
Age: 45
End: 2020-09-26

## 2021-04-22 NOTE — CONSULT NOTE ADULT - REASON FOR ADMISSION
LVM with direct call back number to assist in scheduling 5 week follow up + CT scan with Dr. Nick after being seen on 4/20.    Fever

## 2021-12-20 ENCOUNTER — TRANSCRIPTION ENCOUNTER (OUTPATIENT)
Age: 46
End: 2021-12-20

## 2021-12-21 PROBLEM — F41.9 ANXIETY DISORDER, UNSPECIFIED: Chronic | Status: ACTIVE | Noted: 2020-06-09

## 2021-12-21 PROBLEM — Z00.00 ENCOUNTER FOR PREVENTIVE HEALTH EXAMINATION: Status: ACTIVE | Noted: 2021-12-21

## 2021-12-23 ENCOUNTER — APPOINTMENT (OUTPATIENT)
Dept: ORTHOPEDIC SURGERY | Facility: CLINIC | Age: 46
End: 2021-12-23
Payer: COMMERCIAL

## 2021-12-28 ENCOUNTER — APPOINTMENT (OUTPATIENT)
Dept: ORTHOPEDIC SURGERY | Facility: CLINIC | Age: 46
End: 2021-12-28
Payer: COMMERCIAL

## 2021-12-28 ENCOUNTER — OUTPATIENT (OUTPATIENT)
Dept: OUTPATIENT SERVICES | Facility: HOSPITAL | Age: 46
LOS: 1 days | End: 2021-12-28
Payer: COMMERCIAL

## 2021-12-28 ENCOUNTER — RESULT REVIEW (OUTPATIENT)
Age: 46
End: 2021-12-28

## 2021-12-28 VITALS
DIASTOLIC BLOOD PRESSURE: 70 MMHG | SYSTOLIC BLOOD PRESSURE: 102 MMHG | OXYGEN SATURATION: 97 % | WEIGHT: 125 LBS | HEIGHT: 63 IN | HEART RATE: 96 BPM | BODY MASS INDEX: 22.15 KG/M2

## 2021-12-28 DIAGNOSIS — Z78.9 OTHER SPECIFIED HEALTH STATUS: ICD-10-CM

## 2021-12-28 DIAGNOSIS — Z86.59 PERSONAL HISTORY OF OTHER MENTAL AND BEHAVIORAL DISORDERS: ICD-10-CM

## 2021-12-28 DIAGNOSIS — M25.511 PAIN IN RIGHT SHOULDER: ICD-10-CM

## 2021-12-28 DIAGNOSIS — Z80.9 FAMILY HISTORY OF MALIGNANT NEOPLASM, UNSPECIFIED: ICD-10-CM

## 2021-12-28 DIAGNOSIS — M75.01 ADHESIVE CAPSULITIS OF RIGHT SHOULDER: ICD-10-CM

## 2021-12-28 DIAGNOSIS — Z82.62 FAMILY HISTORY OF OSTEOPOROSIS: ICD-10-CM

## 2021-12-28 DIAGNOSIS — Z87.39 PERSONAL HISTORY OF OTHER DISEASES OF THE MUSCULOSKELETAL SYSTEM AND CONNECTIVE TISSUE: ICD-10-CM

## 2021-12-28 DIAGNOSIS — Z82.61 FAMILY HISTORY OF ARTHRITIS: ICD-10-CM

## 2021-12-28 DIAGNOSIS — G89.29 PAIN IN RIGHT SHOULDER: ICD-10-CM

## 2021-12-28 PROCEDURE — 73030 X-RAY EXAM OF SHOULDER: CPT

## 2021-12-28 PROCEDURE — 99203 OFFICE O/P NEW LOW 30 MIN: CPT

## 2021-12-28 PROCEDURE — 73030 X-RAY EXAM OF SHOULDER: CPT | Mod: 26,RT

## 2021-12-28 RX ORDER — MELOXICAM 15 MG/1
15 TABLET ORAL
Qty: 30 | Refills: 1 | Status: ACTIVE | COMMUNITY
Start: 2021-12-28 | End: 1900-01-01

## 2021-12-28 RX ORDER — SERTRALINE HYDROCHLORIDE 25 MG/1
TABLET, FILM COATED ORAL
Refills: 0 | Status: ACTIVE | COMMUNITY

## 2021-12-28 RX ORDER — DEXTROAMPHETAMINE SACCHARATE, AMPHETAMINE ASPARTATE, DEXTROAMPHETAMINE SULFATE, AND AMPHETAMINE SULFATE 3.75; 3.75; 3.75; 3.75 MG/1; MG/1; MG/1; MG/1
TABLET ORAL
Refills: 0 | Status: ACTIVE | COMMUNITY

## 2021-12-28 RX ORDER — BUPROPION HYDROCHLORIDE 100 MG/1
TABLET, FILM COATED ORAL
Refills: 0 | Status: ACTIVE | COMMUNITY

## 2021-12-28 NOTE — DISCUSSION/SUMMARY
[Medication Risks Reviewed] : Medication risks reviewed [de-identified] : The patient is a 46 year old, rhd woman presenting with a 5 month history of progressive right shoulder pain and stiffness.  Symptoms likely secondary to adhesive capsulitis.\par \par Imaging/Diagnostics/Medical Records were interpreted and/or reviewed and results were reviewed with the patient in detail.  All questions were answered appropriately.\par \par The patient was counseled on the natural progression of the problem(s) today and potential complications of diagnoses.  The patient was provided reassurance today.\par \par Patient was prescribed a course of physical therapy today.  The patient was also provided some general home exercises.  The patient was counseled on activity modification.\par \par Patient was prescribed a short course of Meloxicam.Appropriate use of medication was reviewed with the patient in detail. Risks, benefits, and adverse effects medication were discussed.\par \par Follow-up in 4-6 weeks.  Pending progress, discussed potential injections vs. MRI.\par \par ------------------------------------------------------------------------------------------------------------------\par Patient appreciates and agrees with current plan.\par \par The patient's diagnosis above was evaluated by me, personally.  Diagnostic Testing and treatment options were discussed with the patient in detail.  The risks/benefits/potential complications of diagnostic testing/treatments were described in detail.  \par \par This note was generated using a mixture of manual typing and dragon medical dictation software.  A reasonable effort has been made for proofreading its contents, but typos may still remain.  If there are any questions or points of clarification needed please notify my office.\par \par \par >30 minutes of time was spent on total encounter.  >50% of the visit was spent on face-to-face counseling/coordination of care and medical-decision making for this patient.\par \par \par

## 2021-12-28 NOTE — PHYSICAL EXAM
[de-identified] : General: Well-nourished, well-developed, alert, and in no acute distress.\par Head: Normocephalic.\par Eyes: Pupils equal round reactive to light and accommodation, extraocular muscles intact, normal sclera.\par Nose: No nasal discharge.\par Cardiac: Regular rate. Extremities are warm and well perfused. Distal pulses are symmetric bilaterally.\par Respiratory: No labored breathing.\par Extremities: Sensation is intact distally bilaterally.  Distal pulses are symmetric bilaterally\par Lymphatic: No regional lymphadenopathy, no lymphedema\par Neurologic: No focal deficits\par Skin: Normal skin color, texture, and turgor\par Psychiatric: Normal affect\par MSK: as noted above/below\par \par \par \par RIGHT SHOULDER:\par  \par Inspection:no bruising, rash, erythema, deformity\par Joint Effusion:no\par ROM:DECREASED IN ALL PLANES, FF/ABDUCTION ~150, ER ~50, IR TO BUTTOCK\par Palpation:MIDL SUBACROMIAL PAIN, NO AC joint pain, Bicipital Groove Pain , GH joint pain , Clavicle pain , GT pain \par Distal Pulses:normal\par Upper Extremity Reflexes:2+\par Shoulder Strength: 5/5 \par Upper Extremity Sensation: normal\par \par Special Tests:\par Empty Can: Negative \par Lift-Off Test: PAINFUL\par Cross Arm: POSITIVE\par Neer: POSITIVE\par Juárez: POSITIVE\par Speed: Negative\par \par LEFT SHOULDER:\par  \par Inspection:no bruising, rash, erythema, deformity\par Joint Effusion:no\par ROM:normal Forward Flexion, Abduction , Internal Rotation: , External Rotation \par Palpation: NO AC joint pain, Bicipital Groove Pain , GH joint pain , Clavicle pain , GT pain \par Distal Pulses:normal\par Upper Extremity Reflexes:2+\par Shoulder Strength: 5/5 \par Upper Extremity Sensation: normal\par \par Special Tests:\par Empty Can: Negative \par Lift-Off Test: Negative \par Cross Arm: Negative\par Neer: Negative\par Juárez: Negative\par Speed: Negative\par \par  [de-identified] : Xray RIGHT Shoulder - Multiple views were reviewed with the patient in detail.  There is no acute fracture or dislocation.  There is no joint effusion.  AC joint arthropathy.  We will await formal radiology reading.\par \par

## 2021-12-28 NOTE — HISTORY OF PRESENT ILLNESS
[de-identified] : The patient is a 46 year old, rhd woman presenting with right shoulder pain\par \par The patient presents with a 5 month history of progressive, atraumatic right shoulder pain which has progressed to stiffness over the last few weeks.  The patient denies precipitating injury or trauma.  The patient denies distal numbness, weakness, paresthesias.  No prior shoulder injury.  She has not had any formal treatment.\par \par Pain is moderate in intensity, described as sharp, improved with rest, worse with sleeping on affected side.

## 2022-09-11 ENCOUNTER — NON-APPOINTMENT (OUTPATIENT)
Age: 47
End: 2022-09-11

## 2023-12-27 ENCOUNTER — NON-APPOINTMENT (OUTPATIENT)
Age: 48
End: 2023-12-27

## 2023-12-30 ENCOUNTER — NON-APPOINTMENT (OUTPATIENT)
Age: 48
End: 2023-12-30

## 2024-11-29 ENCOUNTER — NON-APPOINTMENT (OUTPATIENT)
Age: 49
End: 2024-11-29

## 2025-09-08 ENCOUNTER — NON-APPOINTMENT (OUTPATIENT)
Age: 50
End: 2025-09-08

## 2025-09-10 ENCOUNTER — NON-APPOINTMENT (OUTPATIENT)
Age: 50
End: 2025-09-10

## 2025-09-14 ENCOUNTER — NON-APPOINTMENT (OUTPATIENT)
Age: 50
End: 2025-09-14